# Patient Record
Sex: FEMALE | Race: WHITE | HISPANIC OR LATINO | Employment: UNEMPLOYED | URBAN - METROPOLITAN AREA
[De-identification: names, ages, dates, MRNs, and addresses within clinical notes are randomized per-mention and may not be internally consistent; named-entity substitution may affect disease eponyms.]

---

## 2018-04-22 ENCOUNTER — HOSPITAL ENCOUNTER (EMERGENCY)
Facility: HOSPITAL | Age: 23
Discharge: HOME | End: 2018-04-23
Attending: EMERGENCY MEDICINE

## 2018-04-22 DIAGNOSIS — S22.31XA CLOSED FRACTURE OF ONE RIB OF RIGHT SIDE, INITIAL ENCOUNTER: Primary | ICD-10-CM

## 2018-04-22 DIAGNOSIS — S00.83XA FOREHEAD CONTUSION, INITIAL ENCOUNTER: ICD-10-CM

## 2018-04-22 DIAGNOSIS — M54.50 LUMBAR BACK PAIN: ICD-10-CM
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
## 2018-04-22 LAB
B-HCG UR QL: NEGATIVE
CTP QC/QA: YES

## 2018-04-22 PROCEDURE — 63600175 PHARM REV CODE 636 W HCPCS: Performed by: PHYSICIAN ASSISTANT

## 2018-04-22 PROCEDURE — 99284 EMERGENCY DEPT VISIT MOD MDM: CPT | Mod: 25

## 2018-04-22 PROCEDURE — 96372 THER/PROPH/DIAG INJ SC/IM: CPT

## 2018-04-22 PROCEDURE — 81025 URINE PREGNANCY TEST: CPT | Performed by: PHYSICIAN ASSISTANT

## 2018-04-22 RX ORDER — KETOROLAC TROMETHAMINE 30 MG/ML
15 INJECTION, SOLUTION INTRAMUSCULAR; INTRAVENOUS
Status: COMPLETED | OUTPATIENT
Start: 2018-04-22 | End: 2018-04-22

## 2018-04-22 RX ADMIN — KETOROLAC TROMETHAMINE 15 MG: 30 INJECTION, SOLUTION INTRAMUSCULAR at 11:04

## 2018-04-23 VITALS
HEIGHT: 63 IN | HEART RATE: 80 BPM | RESPIRATION RATE: 16 BRPM | WEIGHT: 188 LBS | DIASTOLIC BLOOD PRESSURE: 61 MMHG | TEMPERATURE: 98 F | OXYGEN SATURATION: 98 % | BODY MASS INDEX: 33.31 KG/M2 | SYSTOLIC BLOOD PRESSURE: 118 MMHG

## 2018-04-23 RX ORDER — TRAMADOL HYDROCHLORIDE 50 MG/1
50 TABLET ORAL EVERY 6 HOURS PRN
Qty: 12 TABLET | Refills: 0 | Status: SHIPPED | OUTPATIENT
Start: 2018-04-23 | End: 2018-05-03

## 2018-04-23 RX ORDER — IBUPROFEN 600 MG/1
600 TABLET ORAL EVERY 6 HOURS PRN
Qty: 20 TABLET | Refills: 0 | Status: SHIPPED | OUTPATIENT
Start: 2018-04-23
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
## 2018-04-23 NOTE — ED TRIAGE NOTES
Pt c/o headache, lower abdominal pain on her  scar, lower back pain, blurred vision that has now subsided (started around 1830).  was in 2016. Denies N/V/D, denies dysuria. Denies taking any meds PTA

## 2018-12-21 PROCEDURE — 81025 URINE PREGNANCY TEST: CPT | Performed by: NURSE PRACTITIONER

## 2018-12-21 PROCEDURE — 96361 HYDRATE IV INFUSION ADD-ON: CPT

## 2018-12-21 PROCEDURE — 96374 THER/PROPH/DIAG INJ IV PUSH: CPT

## 2018-12-21 PROCEDURE — 99284 EMERGENCY DEPT VISIT MOD MDM: CPT | Mod: 25

## 2018-12-22 ENCOUNTER — HOSPITAL ENCOUNTER (EMERGENCY)
Facility: HOSPITAL | Age: 23
Discharge: HOME | End: 2018-12-22
Attending: EMERGENCY MEDICINE

## 2018-12-22 VITALS
BODY MASS INDEX: 33.48 KG/M2 | SYSTOLIC BLOOD PRESSURE: 122 MMHG | HEART RATE: 78 BPM | DIASTOLIC BLOOD PRESSURE: 68 MMHG | WEIGHT: 189 LBS | RESPIRATION RATE: 16 BRPM | TEMPERATURE: 99 F | OXYGEN SATURATION: 100 %

## 2018-12-22 DIAGNOSIS — D64.9 MILD ANEMIA: ICD-10-CM

## 2018-12-22 DIAGNOSIS — N93.8 DUB (DYSFUNCTIONAL UTERINE BLEEDING): Primary | ICD-10-CM

## 2018-12-22 DIAGNOSIS — K02.9 PAIN DUE TO DENTAL CARIES: ICD-10-CM

## 2018-12-22 LAB
ALBUMIN SERPL BCP-MCNC: 3.4 G/DL
ALP SERPL-CCNC: 60 U/L
ALT SERPL W/O P-5'-P-CCNC: 15 U/L
ANION GAP SERPL CALC-SCNC: 9 MMOL/L
AST SERPL-CCNC: 17 U/L
B-HCG UR QL: NEGATIVE
BACTERIA #/AREA URNS HPF: ABNORMAL /HPF
BACTERIA GENITAL QL WET PREP: ABNORMAL
BASOPHILS # BLD AUTO: 0.02 K/UL
BASOPHILS NFR BLD: 0.3 %
BILIRUB SERPL-MCNC: 0.2 MG/DL
BILIRUB UR QL STRIP: NEGATIVE
BUN SERPL-MCNC: 9 MG/DL
CALCIUM SERPL-MCNC: 9.4 MG/DL
CHLORIDE SERPL-SCNC: 107 MMOL/L
CLARITY UR: ABNORMAL
CLUE CELLS VAG QL WET PREP: ABNORMAL
CO2 SERPL-SCNC: 25 MMOL/L
COLOR UR: ABNORMAL
CREAT SERPL-MCNC: 0.7 MG/DL
CTP QC/QA: YES
DIFFERENTIAL METHOD: ABNORMAL
EOSINOPHIL # BLD AUTO: 0.2 K/UL
EOSINOPHIL NFR BLD: 2.3 %
ERYTHROCYTE [DISTWIDTH] IN BLOOD BY AUTOMATED COUNT: 13.7 %
EST. GFR  (AFRICAN AMERICAN): >60 ML/MIN/1.73 M^2
EST. GFR  (NON AFRICAN AMERICAN): >60 ML/MIN/1.73 M^2
FILAMENT FUNGI VAG WET PREP-#/AREA: ABNORMAL
GLUCOSE SERPL-MCNC: 107 MG/DL
GLUCOSE UR QL STRIP: NEGATIVE
HCT VFR BLD AUTO: 33.3 %
HGB BLD-MCNC: 11.2 G/DL
HGB UR QL STRIP: ABNORMAL
HYALINE CASTS #/AREA URNS LPF: 0 /LPF
KETONES UR QL STRIP: NEGATIVE
LEUKOCYTE ESTERASE UR QL STRIP: NEGATIVE
LYMPHOCYTES # BLD AUTO: 2.1 K/UL
LYMPHOCYTES NFR BLD: 28.2 %
MCH RBC QN AUTO: 29.1 PG
MCHC RBC AUTO-ENTMCNC: 33.6 G/DL
MCV RBC AUTO: 87 FL
MICROSCOPIC COMMENT: ABNORMAL
MONOCYTES # BLD AUTO: 0.5 K/UL
MONOCYTES NFR BLD: 6.3 %
NEUTROPHILS # BLD AUTO: 4.6 K/UL
NEUTROPHILS NFR BLD: 62.9 %
NITRITE UR QL STRIP: NEGATIVE
PH UR STRIP: 7 [PH] (ref 5–8)
PLATELET # BLD AUTO: 211 K/UL
PMV BLD AUTO: 12.2 FL
POTASSIUM SERPL-SCNC: 3.8 MMOL/L
PROT SERPL-MCNC: 7.5 G/DL
PROT UR QL STRIP: ABNORMAL
RBC # BLD AUTO: 3.85 M/UL
RBC #/AREA URNS HPF: >100 /HPF (ref 0–4)
SODIUM SERPL-SCNC: 141 MMOL/L
SP GR UR STRIP: 1.01 (ref 1–1.03)
SPECIMEN SOURCE: ABNORMAL
T VAGINALIS GENITAL QL WET PREP: ABNORMAL
URN SPEC COLLECT METH UR: ABNORMAL
UROBILINOGEN UR STRIP-ACNC: NEGATIVE EU/DL
WBC # BLD AUTO: 7.28 K/UL
WBC #/AREA URNS HPF: 0 /HPF (ref 0–5)
WBC #/AREA VAG WET PREP: ABNORMAL
YEAST GENITAL QL WET PREP: ABNORMAL

## 2018-12-22 PROCEDURE — 80053 COMPREHEN METABOLIC PANEL: CPT

## 2018-12-22 PROCEDURE — 87491 CHLMYD TRACH DNA AMP PROBE: CPT

## 2018-12-22 PROCEDURE — 85025 COMPLETE CBC W/AUTO DIFF WBC: CPT

## 2018-12-22 PROCEDURE — 81000 URINALYSIS NONAUTO W/SCOPE: CPT

## 2018-12-22 PROCEDURE — 87210 SMEAR WET MOUNT SALINE/INK: CPT

## 2018-12-22 PROCEDURE — 25000003 PHARM REV CODE 250: Performed by: PHYSICIAN ASSISTANT

## 2018-12-22 PROCEDURE — 63600175 PHARM REV CODE 636 W HCPCS: Performed by: PHYSICIAN ASSISTANT

## 2018-12-22 RX ORDER — KETOROLAC TROMETHAMINE 30 MG/ML
30 INJECTION, SOLUTION INTRAMUSCULAR; INTRAVENOUS
Status: COMPLETED | OUTPATIENT
Start: 2018-12-22 | End: 2018-12-22

## 2018-12-22 RX ORDER — IBUPROFEN 600 MG/1
600 TABLET ORAL EVERY 6 HOURS PRN
Qty: 12 TABLET | Refills: 0 | Status: SHIPPED | OUTPATIENT
Start: 2018-12-22

## 2018-12-22 RX ADMIN — SODIUM CHLORIDE 1000 ML: 0.9 INJECTION, SOLUTION INTRAVENOUS at 01:12

## 2018-12-22 RX ADMIN — KETOROLAC TROMETHAMINE 30 MG: 30 INJECTION, SOLUTION INTRAMUSCULAR at 01:12

## 2018-12-22 NOTE — ED PROVIDER NOTES
Encounter Date: 2018    SCRIBE #1 NOTE: I, Ramiro Escobar, am scribing for, and in the presence of,  Michael Abreu PA-C. I have scribed the following portions of the note - Other sections scribed: HPI, ROS.       History     Chief Complaint   Patient presents with    Abdominal Pain     pt states last menstrual period was 2 weeks ago but she continues to have bleeding abd cramping pain in lower abd    Dental Pain     pt complains of dental pain upper right x several days. taking tylenol at home but getting worse     CC: Abdominal Pain; Dental Pain    HPI: This 23 y.o female with PSHx  presents to the ED accompanied by her spouse c/o acute onset, constant, lower abdominal pain and vaginal bleeding for the last 2 weeks. No fever or N/V/D. LMP was 2 weeks ago. Pt also c/o upper R-side dental pain that began 5 days ago (18), dysuria, and increased urinary frequency. No weakness or dizziness. Pt denies attending a dentist and states she has been taking Tylenol with no relief, most recently 1900 tonight (18). She also denies any hormonal medication usage.      The history is provided by the patient and the spouse. The history is limited by a language barrier (Moroccan). A  was used (pt's spouse).     Review of patient's allergies indicates:  No Known Allergies  History reviewed. No pertinent past medical history.  Past Surgical History:   Procedure Laterality Date     SECTION       History reviewed. No pertinent family history.  Social History     Tobacco Use    Smoking status: Never Smoker   Substance Use Topics    Alcohol use: No    Drug use: No     Review of Systems   Constitutional: Negative for chills and fever.   HENT: Positive for dental problem (upper R-side pain). Negative for congestion, ear pain, rhinorrhea and sore throat.    Eyes: Negative for pain and visual disturbance.   Respiratory: Negative for cough and shortness of breath.     Cardiovascular: Negative for chest pain.   Gastrointestinal: Positive for abdominal pain (lower). Negative for diarrhea, nausea and vomiting.   Genitourinary: Positive for dysuria, frequency (increased) and vaginal bleeding.   Musculoskeletal: Negative for back pain and neck pain.   Skin: Negative for rash.   Neurological: Negative for dizziness, weakness and headaches.   All other systems reviewed and are negative.      Physical Exam     Initial Vitals [12/22/18 0009]   BP Pulse Resp Temp SpO2   127/70 86 16 98.5 °F (36.9 °C) 100 %      MAP       --         Vitals:    12/22/18 0009 12/22/18 0242   BP: 127/70 122/68   BP Location: Right arm Right arm   Patient Position: Sitting Sitting   Pulse: 86 78   Resp: 16 16   Temp: 98.5 °F (36.9 °C)    TempSrc: Oral    SpO2: 100% 100%   Weight: 85.7 kg (189 lb)        Physical Exam    Nursing note and vitals reviewed.  Constitutional: She appears well-developed and well-nourished. She is not diaphoretic. No distress.   HENT:   Head: Normocephalic and atraumatic.   Right Ear: Tympanic membrane, external ear and ear canal normal. No mastoid tenderness.   Left Ear: Tympanic membrane, external ear and ear canal normal. No mastoid tenderness.   Nose: Nose normal. Right sinus exhibits no maxillary sinus tenderness and no frontal sinus tenderness. Left sinus exhibits no maxillary sinus tenderness and no frontal sinus tenderness.   Mouth/Throat: Uvula is midline and oropharynx is clear and moist. No trismus in the jaw. Dental caries present. No dental abscesses or uvula swelling. No oropharyngeal exudate, posterior oropharyngeal edema, posterior oropharyngeal erythema or tonsillar abscesses.       No swelling or erythema to gumline. No facial swelling. Speaking in clear and complete sentences.    Eyes: Conjunctivae and EOM are normal. Right eye exhibits no discharge. Left eye exhibits no discharge.   Neck: Normal range of motion. No tracheal deviation present. No JVD present.    Cardiovascular: Normal rate, regular rhythm and normal heart sounds. Exam reveals no friction rub.    No murmur heard.  Pulmonary/Chest: Breath sounds normal. No stridor. No respiratory distress. She has no wheezes. She has no rhonchi. She has no rales. She exhibits no tenderness.   Abdominal: Soft. She exhibits no distension. There is no tenderness. There is no guarding.   Genitourinary: Pelvic exam was performed with patient supine. There is no rash, tenderness or lesion on the right labia. There is no rash, tenderness or lesion on the left labia. Cervix exhibits no motion tenderness, no discharge and no friability. Right adnexum displays no mass and no tenderness. Left adnexum displays no mass and no tenderness. There is bleeding (scant amount of dried blood in vaginal vault; no active bleeding) in the vagina. No erythema or tenderness in the vagina. No foreign body in the vagina. No signs of injury around the vagina. No vaginal discharge found.   Genitourinary Comments: Os closed   Musculoskeletal: Normal range of motion.   Lymphadenopathy:     She has no cervical adenopathy.   Neurological: She is alert and oriented to person, place, and time.   Skin: Skin is warm and dry. No rash and no abscess noted. No erythema. No pallor.         ED Course   Procedures  Labs Reviewed   URINALYSIS, REFLEX TO URINE CULTURE - Abnormal; Notable for the following components:       Result Value    Color, UA Red (*)     Appearance, UA Hazy (*)     Protein, UA 1+ (*)     Occult Blood UA 3+ (*)     All other components within normal limits    Narrative:     Preferred Collection Type->Urine, Clean Catch   CBC W/ AUTO DIFFERENTIAL - Abnormal; Notable for the following components:    RBC 3.85 (*)     Hemoglobin 11.2 (*)     Hematocrit 33.3 (*)     All other components within normal limits   COMPREHENSIVE METABOLIC PANEL - Abnormal; Notable for the following components:    Albumin 3.4 (*)     All other components within normal limits    VAGINAL SCREEN - Abnormal; Notable for the following components:    Bacteria - Vaginal Screen Many (*)     All other components within normal limits   URINALYSIS MICROSCOPIC - Abnormal; Notable for the following components:    RBC, UA >100 (*)     All other components within normal limits    Narrative:     Preferred Collection Type->Urine, Clean Catch   C. TRACHOMATIS/N. GONORRHOEAE BY AMP DNA   POCT URINE PREGNANCY          Imaging Results    None          Medical Decision Making:   History:   Old Medical Records: I decided to obtain old medical records.  Initial Assessment:   23-year-old female with multiple complaints  Clinical Tests:   Lab Tests: Ordered and Reviewed  ED Management:  No dental abscess or Junior's.  No meningeal signs. Patient's dental pain needs further investigation by a dentist.  UPT negative. No active bleeding.  Mild anemia noted on CBC today.  No indication for emergent blood transfusion.  In the absence of other findings, patient may have dysfunctional uterine bleeding that should undergo further investigation by an OBGYN on an outpatient basis.  Low suspicion for acute bacterial process, including for appendicitis.  Not convincing for UTI/pyelonephritis.  No PID on exam.    Advising follow-up with dentist and OBGYN.  Strict return precautions discussed with patient.  Patient agreeable to plan.            Scribe Attestation:   Scribe #1: I performed the above scribed service and the documentation accurately describes the services I performed. I attest to the accuracy of the note.    Attending Attestation:           Physician Attestation for Scribe:  Physician Attestation Statement for Scribe #1: I, Michael Abreu PA-C, reviewed documentation, as scribed by Ramiro Escobar in my presence, and it is both accurate and complete.                    Clinical Impression:   The primary encounter diagnosis was DUB (dysfunctional uterine bleeding). Diagnoses of Mild anemia and Pain due to dental  caries were also pertinent to this visit.                             Michael Abreu PA-C  12/22/18 0601

## 2018-12-25 LAB
C TRACH DNA SPEC QL NAA+PROBE: NOT DETECTED
N GONORRHOEA DNA SPEC QL NAA+PROBE: NOT DETECTED

## 2022-09-08 ENCOUNTER — HOSPITAL ENCOUNTER (EMERGENCY)
Facility: HOSPITAL | Age: 27
Discharge: HOME | End: 2022-09-08
Attending: EMERGENCY MEDICINE
Payer: MEDICAID

## 2022-09-08 VITALS
HEART RATE: 86 BPM | RESPIRATION RATE: 18 BRPM | WEIGHT: 230 LBS | SYSTOLIC BLOOD PRESSURE: 113 MMHG | DIASTOLIC BLOOD PRESSURE: 74 MMHG | BODY MASS INDEX: 39.27 KG/M2 | OXYGEN SATURATION: 96 % | TEMPERATURE: 99 F | HEIGHT: 64 IN

## 2022-09-08 DIAGNOSIS — R51.9 ACUTE NONINTRACTABLE HEADACHE, UNSPECIFIED HEADACHE TYPE: Primary | ICD-10-CM

## 2022-09-08 LAB
B-HCG UR QL: NEGATIVE
BILIRUB UR QL STRIP: NEGATIVE
CLARITY UR: ABNORMAL
COLOR UR: YELLOW
CTP QC/QA: YES
CTP QC/QA: YES
GLUCOSE UR QL STRIP: NEGATIVE
HGB UR QL STRIP: NEGATIVE
KETONES UR QL STRIP: NEGATIVE
LEUKOCYTE ESTERASE UR QL STRIP: NEGATIVE
NITRITE UR QL STRIP: NEGATIVE
PH UR STRIP: 8 [PH] (ref 5–8)
POCT GLUCOSE: 117 MG/DL (ref 70–110)
PROT UR QL STRIP: NEGATIVE
SARS-COV-2 RDRP RESP QL NAA+PROBE: NEGATIVE
SP GR UR STRIP: 1.02 (ref 1–1.03)
URN SPEC COLLECT METH UR: ABNORMAL
UROBILINOGEN UR STRIP-ACNC: NEGATIVE EU/DL

## 2022-09-08 PROCEDURE — 81003 URINALYSIS AUTO W/O SCOPE: CPT | Performed by: PHYSICIAN ASSISTANT

## 2022-09-08 PROCEDURE — 81025 URINE PREGNANCY TEST: CPT | Performed by: EMERGENCY MEDICINE

## 2022-09-08 PROCEDURE — 99285 EMERGENCY DEPT VISIT HI MDM: CPT | Mod: 25

## 2022-09-08 PROCEDURE — 96374 THER/PROPH/DIAG INJ IV PUSH: CPT

## 2022-09-08 PROCEDURE — U0002 COVID-19 LAB TEST NON-CDC: HCPCS | Performed by: PHYSICIAN ASSISTANT

## 2022-09-08 PROCEDURE — 96361 HYDRATE IV INFUSION ADD-ON: CPT

## 2022-09-08 PROCEDURE — 63600175 PHARM REV CODE 636 W HCPCS: Performed by: PHYSICIAN ASSISTANT

## 2022-09-08 PROCEDURE — 82962 GLUCOSE BLOOD TEST: CPT

## 2022-09-08 PROCEDURE — 96375 TX/PRO/DX INJ NEW DRUG ADDON: CPT

## 2022-09-08 RX ORDER — DIPHENHYDRAMINE HYDROCHLORIDE 50 MG/ML
25 INJECTION INTRAMUSCULAR; INTRAVENOUS
Status: COMPLETED | OUTPATIENT
Start: 2022-09-08 | End: 2022-09-08

## 2022-09-08 RX ORDER — PROCHLORPERAZINE EDISYLATE 5 MG/ML
10 INJECTION INTRAMUSCULAR; INTRAVENOUS ONCE
Status: COMPLETED | OUTPATIENT
Start: 2022-09-08 | End: 2022-09-08

## 2022-09-08 RX ORDER — PROCHLORPERAZINE MALEATE 10 MG
10 TABLET ORAL 3 TIMES DAILY PRN
Qty: 10 TABLET | Refills: 0 | Status: SHIPPED | OUTPATIENT
Start: 2022-09-08

## 2022-09-08 RX ORDER — DIPHENHYDRAMINE HCL 25 MG
25 CAPSULE ORAL 3 TIMES DAILY PRN
Qty: 10 CAPSULE | Refills: 0 | Status: SHIPPED | OUTPATIENT
Start: 2022-09-08

## 2022-09-08 RX ORDER — DEXAMETHASONE SODIUM PHOSPHATE 4 MG/ML
12 INJECTION, SOLUTION INTRA-ARTICULAR; INTRALESIONAL; INTRAMUSCULAR; INTRAVENOUS; SOFT TISSUE
Status: COMPLETED | OUTPATIENT
Start: 2022-09-08 | End: 2022-09-08

## 2022-09-08 RX ADMIN — DIPHENHYDRAMINE HYDROCHLORIDE 25 MG: 50 INJECTION, SOLUTION INTRAMUSCULAR; INTRAVENOUS at 04:09

## 2022-09-08 RX ADMIN — SODIUM CHLORIDE, SODIUM LACTATE, POTASSIUM CHLORIDE, AND CALCIUM CHLORIDE 1000 ML: .6; .31; .03; .02 INJECTION, SOLUTION INTRAVENOUS at 04:09

## 2022-09-08 RX ADMIN — DEXAMETHASONE SODIUM PHOSPHATE 12 MG: 4 INJECTION INTRA-ARTICULAR; INTRALESIONAL; INTRAMUSCULAR; INTRAVENOUS; SOFT TISSUE at 04:09

## 2022-09-08 RX ADMIN — PROCHLORPERAZINE EDISYLATE 10 MG: 5 INJECTION INTRAMUSCULAR; INTRAVENOUS at 04:09

## 2022-09-08 NOTE — ED TRIAGE NOTES
Pt presents to ED c/o HA accompanied by nausea and dizziness.  Pt is alert, calm, and oriented x4, placed on continuous pulse ox, 100% on RA.

## 2022-09-08 NOTE — DISCHARGE INSTRUCTIONS
Continúe con Tylenol o ibuprofeno según sea necesario para el dolor de sebastien. Puede salina robson dosis única de Compazine y Benadryl juntos según sea necesario para el dolor de sebastien intenso continuo a pesar del tratamiento. Regrese a rose mary servicio de urgencias si el dolor de sebastien empeora a pesar del tratamiento, si comienza con vómitos frecuentes, si se siente aturdido o mareado, si no puede caminar o soportar peso, si ocurre cualquier otro problema.    Continue with Tylenol or ibuprofen as needed for headache.  You can take a single dose of Compazine and Benadryl together as needed for continued severe headache despite treatment.  Return to this ED if worsening headache despite treatment, if you begin with frequent vomiting, if you feel lightheaded or dizzy, if unable to walk or bear weight, if any other problems occur.

## 2022-09-08 NOTE — ED PROVIDER NOTES
Encounter Date: 2022       History     Chief Complaint   Patient presents with    Headache     Started around  last night, states moves around. Accompanied by nausea. Had blurred vision but resolved.Took IBU without relief.     28yo F with chief complaint severe headache to the crown of her head since earlier this evening.  Denies history of similar headaches.  Denies history of headache disorder.  Denies neck pain or stiffness.  No fever.  No trauma.  She states there was mild OU blurred vision with daily, has resolved.  No diplopia, no floaters or flashes of light she does admit to severe photophobia.  Denies eye pain.  No nausea vomiting.  No recent illness.  No known sick contacts.  No relief with ibuprofen.  Headache is severe, described as a throbbing pressure.  No associated or leg weakness, slurred speech, facial droop, unsteady gait, aphasia.    Denies any significant past medical history    Review of patient's allergies indicates:  No Known Allergies  History reviewed. No pertinent past medical history.  Past Surgical History:   Procedure Laterality Date     SECTION       History reviewed. No pertinent family history.  Social History     Tobacco Use    Smoking status: Never   Substance Use Topics    Alcohol use: No    Drug use: No     Review of Systems   Constitutional:  Negative for appetite change, chills and fever.   Eyes:  Positive for photophobia and visual disturbance. Negative for pain.   Respiratory:  Negative for shortness of breath.    Cardiovascular:  Negative for chest pain.   Gastrointestinal:  Negative for nausea and vomiting.   Musculoskeletal:  Negative for myalgias, neck pain and neck stiffness.   Neurological:  Positive for headaches. Negative for dizziness, syncope and light-headedness.     Physical Exam     Initial Vitals [22 0122]   BP Pulse Resp Temp SpO2   132/68 93 17 98.5 °F (36.9 °C) 100 %      MAP       --         Physical Exam    Nursing note and vitals  reviewed.  Constitutional: She appears well-developed and well-nourished. She is not diaphoretic. No distress.   Uncomfortable appearing, nontoxic.   HENT:   Head: Normocephalic and atraumatic.   Eyes: EOM are normal. Pupils are equal, round, and reactive to light.   Neck: Neck supple.   No nuchal rigidity   Normal range of motion.  Pulmonary/Chest: No respiratory distress.   Musculoskeletal:         General: Normal range of motion.      Cervical back: Normal range of motion and neck supple.     Neurological: She is alert and oriented to person, place, and time. GCS score is 15. GCS eye subscore is 4. GCS verbal subscore is 5. GCS motor subscore is 6.   Skin: Skin is warm. Capillary refill takes less than 2 seconds.   Psychiatric: She has a normal mood and affect. Thought content normal.       ED Course   Procedures  Labs Reviewed   URINALYSIS, REFLEX TO URINE CULTURE - Abnormal; Notable for the following components:       Result Value    Appearance, UA Hazy (*)     All other components within normal limits    Narrative:     Specimen Source->Urine   POCT GLUCOSE - Abnormal; Notable for the following components:    POCT Glucose 117 (*)     All other components within normal limits   POCT URINE PREGNANCY   SARS-COV-2 RDRP GENE   POCT GLUCOSE MONITORING CONTINUOUS          Imaging Results              CT Head Without Contrast (Final result)  Result time 09/08/22 03:36:40      Final result by Brianda Hernandez MD (09/08/22 03:36:40)                   Impression:      No CT evidence of acute intracranial abnormality. If the patient's headaches are sufficiently clinically suspicious, or associated with signs of elevated ICP, focal neurologic deficits, nausea, or vomiting, further evaluation with MRI can be performed if there are no clinical contraindications.      Electronically signed by: Brianda Hernandez MD  Date:    09/08/2022  Time:    03:36               Narrative:    EXAMINATION:  CT HEAD WITHOUT CONTRAST    CLINICAL  HISTORY:  Headache, new or worsening, neuro deficit (Age 19-49y);    TECHNIQUE:  Low dose axial images were obtained through the head.  Coronal and sagittal reformations were also performed. Contrast was not administered.    COMPARISON:  None.    FINDINGS:  There is no acute intracranial hemorrhage, hydrocephalus, midline shift or mass effect. Gray-white matter differentiation appears maintained. The basal cisterns are patent. The mastoid air cells are clear.  There is mild mucosal thickening of the left maxillary sinus and ethmoid air cells..  The visualized bones of the calvarium demonstrate no acute osseous abnormality.                                       Medications   lactated ringers bolus 1,000 mL (1,000 mLs Intravenous New Bag 9/8/22 0449)   dexamethasone injection 12 mg (12 mg Intravenous Given 9/8/22 0420)   diphenhydrAMINE injection 25 mg (25 mg Intravenous Given 9/8/22 0405)   prochlorperazine injection Soln 10 mg (10 mg Intravenous Given 9/8/22 0425)     Medical Decision Making:   Differential Diagnosis:   Headache disorder, CVA, meningitis           ED Course as of 09/08/22 0527   Thu Sep 08, 2022   0521 Headache resolved on re-evaluation.  Will discharge with similar medications as needed for headache.  Advised follow-up with local PCP.  Information for follow-up given.  Return precautions given. [SM]      ED Course User Index  [SM] Davie Rendon PA-C             Clinical Impression:   Final diagnoses:  [R51.9] Acute nonintractable headache, unspecified headache type (Primary)      ED Disposition Condition    Discharge Stable          ED Prescriptions       Medication Sig Dispense Start Date End Date Auth. Provider    prochlorperazine (COMPAZINE) 10 MG tablet Take 1 tablet (10 mg total) by mouth 3 (three) times daily as needed (Severe headache--take with 25mg of Benadryl). 10 tablet 9/8/2022 -- Davie Rendon PA-C    diphenhydrAMINE (BENADRYL) 25 mg capsule Take 1 capsule (25 mg total) by  mouth 3 (three) times daily as needed (Severe headache--take with 10mg of Compazine). 10 capsule 9/8/2022 -- Davie Rendon PA-C          Follow-up Information    None          Davie Rendon PA-C  09/08/22 0557

## 2023-05-31 ENCOUNTER — HOSPITAL ENCOUNTER (OUTPATIENT)
Facility: HOSPITAL | Age: 28
Discharge: HOME | End: 2023-05-31
Attending: OBSTETRICS & GYNECOLOGY | Admitting: OBSTETRICS & GYNECOLOGY
Payer: MEDICAID

## 2023-05-31 VITALS
HEART RATE: 80 BPM | DIASTOLIC BLOOD PRESSURE: 60 MMHG | SYSTOLIC BLOOD PRESSURE: 108 MMHG | HEIGHT: 64 IN | TEMPERATURE: 98 F | BODY MASS INDEX: 39.27 KG/M2 | RESPIRATION RATE: 18 BRPM | WEIGHT: 230 LBS | OXYGEN SATURATION: 99 %

## 2023-05-31 DIAGNOSIS — R10.2 PELVIC PAIN: ICD-10-CM

## 2023-05-31 LAB
BILIRUB UR QL STRIP: NEGATIVE
CLARITY UR: CLEAR
COLOR UR: YELLOW
GLUCOSE UR QL STRIP: NEGATIVE
HGB UR QL STRIP: NEGATIVE
KETONES UR QL STRIP: NEGATIVE
LEUKOCYTE ESTERASE UR QL STRIP: NEGATIVE
NITRITE UR QL STRIP: NEGATIVE
PH UR STRIP: 7 [PH] (ref 5–8)
PROT UR QL STRIP: ABNORMAL
SP GR UR STRIP: 1.03 (ref 1–1.03)
URN SPEC COLLECT METH UR: ABNORMAL
UROBILINOGEN UR STRIP-ACNC: NEGATIVE EU/DL

## 2023-05-31 PROCEDURE — 99211 OFF/OP EST MAY X REQ PHY/QHP: CPT

## 2023-05-31 PROCEDURE — G0378 HOSPITAL OBSERVATION PER HR: HCPCS

## 2023-05-31 PROCEDURE — 81003 URINALYSIS AUTO W/O SCOPE: CPT | Performed by: OBSTETRICS & GYNECOLOGY

## 2023-05-31 NOTE — DISCHARGE INSTRUCTIONS
Home Undelivered Discharge Instructions    After Discharge Orders:    No future appointments.      Current Discharge Medication List        CONTINUE these medications which have NOT CHANGED    Details   acetaminophen (TYLENOL ORAL) Take by mouth.      diphenhydrAMINE (BENADRYL) 25 mg capsule Take 1 capsule (25 mg total) by mouth 3 (three) times daily as needed (Severe headache--take with 10mg of Compazine).  Qty: 10 capsule, Refills: 0      !! ibuprofen (ADVIL,MOTRIN) 600 MG tablet Take 1 tablet (600 mg total) by mouth every 6 (six) hours as needed for Pain.  Qty: 20 tablet, Refills: 0      !! ibuprofen (ADVIL,MOTRIN) 600 MG tablet Take 1 tablet (600 mg total) by mouth every 6 (six) hours as needed for Pain.  Qty: 12 tablet, Refills: 0      prochlorperazine (COMPAZINE) 10 MG tablet Take 1 tablet (10 mg total) by mouth 3 (three) times daily as needed (Severe headache--take with 25mg of Benadryl).  Qty: 10 tablet, Refills: 0       !! - Potential duplicate medications found. Please discuss with provider.                  Diet:  normal diet as tolerated    Rest: normal activity as tolerated    Other instructions: Do kick counts once a day on your baby. Choose the time of day your baby is most active. Get in a comfortable lying or sitting position and time how long it takes to feel 10 kicks, twists, turns, swishes, or rolls.     Call physician or midwife, return to Labor and Delivery, call 911, or go to the nearest Emergency Room if: increased leakage or fluid, contractions more than  6 per  1 hour, decreased fetal movement, persistent low back pain or cramping, bleeding from vaginal area, difficulty urinating, pain with urination, difficulty breathing, new calf pain, persistent headache, or vision change

## 2023-05-31 NOTE — NURSING
Dr Mancera notified of patient arrival and US results ([facility]) and [facility]. Orders for [facility]; if closed discharge home. If patient would like to deliver at [facility], Dr Mancera is open to continuing her care. Patient updated on plan of care and discharge. (SVE (closed/thick/-4)

## 2023-05-31 NOTE — NURSING
Faustino #     Patient reports burning sensation in abdomen and vaginal heaviness this morning, discomfort increases with walking. Denies UTI symptoms and vaginal bleeding. Prior c/s for failure to progress in 2016. No complication with prior deliveries. Established care with [facility] Dr. Pardo x1 visit (1st US scheduled Friday). Urine collected. [facility] doppler 155.

## 2024-06-23 ENCOUNTER — ANESTHESIA (OUTPATIENT)
Dept: OBSTETRICS AND GYNECOLOGY | Facility: HOSPITAL | Age: 29
End: 2024-06-23
Payer: MEDICAID

## 2024-06-23 ENCOUNTER — ANESTHESIA EVENT (OUTPATIENT)
Dept: OBSTETRICS AND GYNECOLOGY | Facility: HOSPITAL | Age: 29
End: 2024-06-23
Payer: MEDICAID

## 2024-06-23 ENCOUNTER — HOSPITAL ENCOUNTER (INPATIENT)
Facility: HOSPITAL | Age: 29
LOS: 2 days | Discharge: HOME | End: 2024-06-25
Attending: STUDENT IN AN ORGANIZED HEALTH CARE EDUCATION/TRAINING PROGRAM | Admitting: OBSTETRICS & GYNECOLOGY
Payer: MEDICAID

## 2024-06-23 DIAGNOSIS — N93.9 VAGINAL HEMORRHAGE: ICD-10-CM

## 2024-06-23 DIAGNOSIS — O36.4XX0 FETAL DEMISE, GREATER THAN 22 WEEKS, ANTEPARTUM, SINGLE GESTATION: ICD-10-CM

## 2024-06-23 DIAGNOSIS — O46.90 VAGINAL BLEEDING IN PREGNANCY: ICD-10-CM

## 2024-06-23 DIAGNOSIS — O99.019 ANEMIA AFFECTING FIFTH PREGNANCY: ICD-10-CM

## 2024-06-23 DIAGNOSIS — Z98.891 STATUS POST CESAREAN SECTION: Primary | ICD-10-CM

## 2024-06-23 DIAGNOSIS — O09.40 ANEMIA AFFECTING FIFTH PREGNANCY: ICD-10-CM

## 2024-06-23 DIAGNOSIS — E86.1 HYPOTENSION DUE TO HYPOVOLEMIA: ICD-10-CM

## 2024-06-23 DIAGNOSIS — O02.1 FETAL DEMISE BEFORE 20 WEEKS WITH RETENTION OF DEAD FETUS: ICD-10-CM

## 2024-06-23 DIAGNOSIS — O09.32 INSUFFICIENT PRENATAL CARE IN SECOND TRIMESTER: ICD-10-CM

## 2024-06-23 PROBLEM — D64.9 SEVERE ANEMIA: Status: ACTIVE | Noted: 2024-06-23

## 2024-06-23 PROBLEM — O34.219 PREVIOUS CESAREAN SECTION COMPLICATING PREGNANCY, ANTEPARTUM CONDITION OR COMPLICATION: Status: ACTIVE | Noted: 2024-06-23

## 2024-06-23 PROBLEM — Z3A.27 27 WEEKS GESTATION OF PREGNANCY: Status: RESOLVED | Noted: 2024-06-23 | Resolved: 2024-06-23

## 2024-06-23 PROBLEM — Z3A.27 27 WEEKS GESTATION OF PREGNANCY: Status: ACTIVE | Noted: 2024-06-23

## 2024-06-23 LAB
ABO + RH BLD: NORMAL
ALBUMIN SERPL BCP-MCNC: 1.9 G/DL (ref 3.5–5.2)
ALBUMIN SERPL BCP-MCNC: 2.2 G/DL (ref 3.5–5.2)
ALLENS TEST: ABNORMAL
ALLENS TEST: ABNORMAL
ALP SERPL-CCNC: 56 U/L (ref 55–135)
ALP SERPL-CCNC: 59 U/L (ref 55–135)
ALT SERPL W/O P-5'-P-CCNC: 7 U/L (ref 10–44)
ALT SERPL W/O P-5'-P-CCNC: 8 U/L (ref 10–44)
ANION GAP SERPL CALC-SCNC: 14 MMOL/L (ref 8–16)
ANION GAP SERPL CALC-SCNC: 6 MMOL/L (ref 8–16)
ANION GAP SERPL CALC-SCNC: 8 MMOL/L (ref 8–16)
APTT PPP: 23.4 SEC (ref 21–32)
AST SERPL-CCNC: 11 U/L (ref 10–40)
AST SERPL-CCNC: 14 U/L (ref 10–40)
BASOPHILS # BLD AUTO: 0.01 K/UL (ref 0–0.2)
BASOPHILS # BLD AUTO: 0.01 K/UL (ref 0–0.2)
BASOPHILS # BLD AUTO: 0.02 K/UL (ref 0–0.2)
BASOPHILS # BLD AUTO: 0.03 K/UL (ref 0–0.2)
BASOPHILS # BLD AUTO: 0.03 K/UL (ref 0–0.2)
BASOPHILS NFR BLD: 0.1 % (ref 0–1.9)
BASOPHILS NFR BLD: 0.1 % (ref 0–1.9)
BASOPHILS NFR BLD: 0.2 % (ref 0–1.9)
BILIRUB SERPL-MCNC: 0.3 MG/DL (ref 0.1–1)
BILIRUB SERPL-MCNC: 0.9 MG/DL (ref 0.1–1)
BLD GP AB SCN CELLS X3 SERPL QL: NORMAL
BLD PROD TYP BPU: NORMAL
BLOOD UNIT EXPIRATION DATE: NORMAL
BLOOD UNIT TYPE CODE: 9500
BLOOD UNIT TYPE: NORMAL
BUN SERPL-MCNC: 8 MG/DL (ref 6–20)
BUN SERPL-MCNC: 8 MG/DL (ref 6–30)
BUN SERPL-MCNC: 9 MG/DL (ref 6–20)
CALCIUM SERPL-MCNC: 7 MG/DL (ref 8.7–10.5)
CALCIUM SERPL-MCNC: 7.6 MG/DL (ref 8.7–10.5)
CHLORIDE SERPL-SCNC: 109 MMOL/L (ref 95–110)
CHLORIDE SERPL-SCNC: 112 MMOL/L (ref 95–110)
CHLORIDE SERPL-SCNC: 113 MMOL/L (ref 95–110)
CO2 SERPL-SCNC: 15 MMOL/L (ref 23–29)
CO2 SERPL-SCNC: 19 MMOL/L (ref 23–29)
CODING SYSTEM: NORMAL
CREAT SERPL-MCNC: 0.5 MG/DL (ref 0.5–1.4)
CREAT SERPL-MCNC: 0.6 MG/DL (ref 0.5–1.4)
CREAT SERPL-MCNC: 0.6 MG/DL (ref 0.5–1.4)
CROSSMATCH INTERPRETATION: NORMAL
D DIMER PPP IA.FEU-MCNC: 0.86 MG/L FEU
DIFFERENTIAL METHOD BLD: ABNORMAL
DISPENSE STATUS: NORMAL
EOSINOPHIL # BLD AUTO: 0 K/UL (ref 0–0.5)
EOSINOPHIL NFR BLD: 0 % (ref 0–8)
EOSINOPHIL NFR BLD: 0.1 % (ref 0–8)
EOSINOPHIL NFR BLD: 0.1 % (ref 0–8)
ERYTHROCYTE [DISTWIDTH] IN BLOOD BY AUTOMATED COUNT: 16.7 % (ref 11.5–14.5)
ERYTHROCYTE [DISTWIDTH] IN BLOOD BY AUTOMATED COUNT: 16.8 % (ref 11.5–14.5)
ERYTHROCYTE [DISTWIDTH] IN BLOOD BY AUTOMATED COUNT: 17.2 % (ref 11.5–14.5)
ERYTHROCYTE [DISTWIDTH] IN BLOOD BY AUTOMATED COUNT: 17.5 % (ref 11.5–14.5)
ERYTHROCYTE [DISTWIDTH] IN BLOOD BY AUTOMATED COUNT: 17.5 % (ref 11.5–14.5)
EST. GFR  (NO RACE VARIABLE): >60 ML/MIN/1.73 M^2
EST. GFR  (NO RACE VARIABLE): >60 ML/MIN/1.73 M^2
FIBRINOGEN PPP-MCNC: 439 MG/DL (ref 182–400)
GLUCOSE SERPL-MCNC: 108 MG/DL (ref 70–110)
GLUCOSE SERPL-MCNC: 157 MG/DL (ref 70–110)
GLUCOSE SERPL-MCNC: 169 MG/DL (ref 70–110)
HBV SURFACE AG SERPL QL IA: NORMAL
HCG INTACT+B SERPL-ACNC: 2273 MIU/ML
HCO3 UR-SCNC: 17 MMOL/L (ref 24–28)
HCT VFR BLD AUTO: 18.7 % (ref 37–48.5)
HCT VFR BLD AUTO: 21.6 % (ref 37–48.5)
HCT VFR BLD AUTO: 23.3 % (ref 37–48.5)
HCT VFR BLD AUTO: 24.5 % (ref 37–48.5)
HCT VFR BLD AUTO: 24.5 % (ref 37–48.5)
HCT VFR BLD CALC: 22 %PCV (ref 36–54)
HGB BLD-MCNC: 5.7 G/DL (ref 12–16)
HGB BLD-MCNC: 7 G/DL (ref 12–16)
HGB BLD-MCNC: 7.2 G/DL (ref 12–16)
HGB BLD-MCNC: 7.7 G/DL (ref 12–16)
HGB BLD-MCNC: 7.7 G/DL (ref 12–16)
HIV1+2 IGG SERPL QL IA.RAPID: NORMAL
IMM GRANULOCYTES # BLD AUTO: 0.09 K/UL (ref 0–0.04)
IMM GRANULOCYTES # BLD AUTO: 0.11 K/UL (ref 0–0.04)
IMM GRANULOCYTES # BLD AUTO: 0.11 K/UL (ref 0–0.04)
IMM GRANULOCYTES NFR BLD AUTO: 0.6 % (ref 0–0.5)
IMM GRANULOCYTES NFR BLD AUTO: 0.6 % (ref 0–0.5)
IMM GRANULOCYTES NFR BLD AUTO: 0.7 % (ref 0–0.5)
IMM GRANULOCYTES NFR BLD AUTO: 0.7 % (ref 0–0.5)
IMM GRANULOCYTES NFR BLD AUTO: 0.8 % (ref 0–0.5)
INR PPP: 1 (ref 0.8–1.2)
LYMPHOCYTES # BLD AUTO: 0.7 K/UL (ref 1–4.8)
LYMPHOCYTES # BLD AUTO: 0.8 K/UL (ref 1–4.8)
LYMPHOCYTES # BLD AUTO: 0.9 K/UL (ref 1–4.8)
LYMPHOCYTES # BLD AUTO: 0.9 K/UL (ref 1–4.8)
LYMPHOCYTES # BLD AUTO: 1.1 K/UL (ref 1–4.8)
LYMPHOCYTES NFR BLD: 4.9 % (ref 18–48)
LYMPHOCYTES NFR BLD: 4.9 % (ref 18–48)
LYMPHOCYTES NFR BLD: 5.9 % (ref 18–48)
LYMPHOCYTES NFR BLD: 7.8 % (ref 18–48)
LYMPHOCYTES NFR BLD: 8.1 % (ref 18–48)
MCH RBC QN AUTO: 22.9 PG (ref 27–31)
MCH RBC QN AUTO: 24.8 PG (ref 27–31)
MCH RBC QN AUTO: 24.8 PG (ref 27–31)
MCH RBC QN AUTO: 26 PG (ref 27–31)
MCH RBC QN AUTO: 26.3 PG (ref 27–31)
MCHC RBC AUTO-ENTMCNC: 30.5 G/DL (ref 32–36)
MCHC RBC AUTO-ENTMCNC: 30.9 G/DL (ref 32–36)
MCHC RBC AUTO-ENTMCNC: 31.4 G/DL (ref 32–36)
MCHC RBC AUTO-ENTMCNC: 31.4 G/DL (ref 32–36)
MCHC RBC AUTO-ENTMCNC: 32.4 G/DL (ref 32–36)
MCV RBC AUTO: 75 FL (ref 82–98)
MCV RBC AUTO: 79 FL (ref 82–98)
MCV RBC AUTO: 79 FL (ref 82–98)
MCV RBC AUTO: 81 FL (ref 82–98)
MCV RBC AUTO: 84 FL (ref 82–98)
MONOCYTES # BLD AUTO: 0.3 K/UL (ref 0.3–1)
MONOCYTES # BLD AUTO: 0.5 K/UL (ref 0.3–1)
MONOCYTES # BLD AUTO: 0.6 K/UL (ref 0.3–1)
MONOCYTES NFR BLD: 2.5 % (ref 4–15)
MONOCYTES NFR BLD: 2.5 % (ref 4–15)
MONOCYTES NFR BLD: 2.6 % (ref 4–15)
MONOCYTES NFR BLD: 4 % (ref 4–15)
MONOCYTES NFR BLD: 4.2 % (ref 4–15)
NEUTROPHILS # BLD AUTO: 11 K/UL (ref 1.8–7.7)
NEUTROPHILS # BLD AUTO: 11.7 K/UL (ref 1.8–7.7)
NEUTROPHILS # BLD AUTO: 17.1 K/UL (ref 1.8–7.7)
NEUTROPHILS # BLD AUTO: 17.1 K/UL (ref 1.8–7.7)
NEUTROPHILS # BLD AUTO: 9.5 K/UL (ref 1.8–7.7)
NEUTROPHILS NFR BLD: 86.8 % (ref 38–73)
NEUTROPHILS NFR BLD: 88.5 % (ref 38–73)
NEUTROPHILS NFR BLD: 89.3 % (ref 38–73)
NEUTROPHILS NFR BLD: 91.8 % (ref 38–73)
NEUTROPHILS NFR BLD: 91.8 % (ref 38–73)
NRBC BLD-RTO: 0 /100 WBC
PCO2 BLDA: 29 MMHG (ref 35–45)
PH SMN: 7.38 [PH] (ref 7.35–7.45)
PLATELET # BLD AUTO: 120 K/UL (ref 150–450)
PLATELET # BLD AUTO: 127 K/UL (ref 150–450)
PLATELET # BLD AUTO: 152 K/UL (ref 150–450)
PLATELET # BLD AUTO: 173 K/UL (ref 150–450)
PLATELET # BLD AUTO: 173 K/UL (ref 150–450)
PMV BLD AUTO: 11.1 FL (ref 9.2–12.9)
PMV BLD AUTO: 11.1 FL (ref 9.2–12.9)
PMV BLD AUTO: 11.4 FL (ref 9.2–12.9)
PMV BLD AUTO: 11.6 FL (ref 9.2–12.9)
PMV BLD AUTO: 11.9 FL (ref 9.2–12.9)
PO2 BLDA: 57 MMHG (ref 40–60)
POC BE: -7 MMOL/L
POC IONIZED CALCIUM: 1.13 MMOL/L (ref 1.06–1.42)
POC SATURATED O2: 89 % (ref 95–100)
POC TCO2 (MEASURED): 17 MMOL/L (ref 23–29)
POC TCO2: 18 MMOL/L (ref 24–29)
POTASSIUM BLD-SCNC: 3.5 MMOL/L (ref 3.5–5.1)
POTASSIUM SERPL-SCNC: 3.8 MMOL/L (ref 3.5–5.1)
POTASSIUM SERPL-SCNC: 3.8 MMOL/L (ref 3.5–5.1)
PROT SERPL-MCNC: 4.8 G/DL (ref 6–8.4)
PROT SERPL-MCNC: 5.3 G/DL (ref 6–8.4)
PROTHROMBIN TIME: 10.9 SEC (ref 9–12.5)
RBC # BLD AUTO: 2.49 M/UL (ref 4–5.4)
RBC # BLD AUTO: 2.66 M/UL (ref 4–5.4)
RBC # BLD AUTO: 2.77 M/UL (ref 4–5.4)
RBC # BLD AUTO: 3.11 M/UL (ref 4–5.4)
RBC # BLD AUTO: 3.11 M/UL (ref 4–5.4)
SAMPLE: ABNORMAL
SAMPLE: ABNORMAL
SITE: ABNORMAL
SITE: ABNORMAL
SODIUM BLD-SCNC: 136 MMOL/L (ref 136–145)
SODIUM SERPL-SCNC: 136 MMOL/L (ref 136–145)
SODIUM SERPL-SCNC: 137 MMOL/L (ref 136–145)
SPECIMEN OUTDATE: NORMAL
TRANS ERYTHROCYTES VOL PATIENT: NORMAL ML
TREPONEMA PALLIDUM IGG+IGM AB [PRESENCE] IN SERUM OR PLASMA BY IMMUNOASSAY: NONREACTIVE
WBC # BLD AUTO: 10.75 K/UL (ref 3.9–12.7)
WBC # BLD AUTO: 12.27 K/UL (ref 3.9–12.7)
WBC # BLD AUTO: 13.45 K/UL (ref 3.9–12.7)
WBC # BLD AUTO: 18.59 K/UL (ref 3.9–12.7)
WBC # BLD AUTO: 18.59 K/UL (ref 3.9–12.7)

## 2024-06-23 PROCEDURE — 30233N1 TRANSFUSION OF NONAUTOLOGOUS RED BLOOD CELLS INTO PERIPHERAL VEIN, PERCUTANEOUS APPROACH: ICD-10-PCS | Performed by: OBSTETRICS & GYNECOLOGY

## 2024-06-23 PROCEDURE — 85730 THROMBOPLASTIN TIME PARTIAL: CPT | Performed by: ANESTHESIOLOGY

## 2024-06-23 PROCEDURE — 25000003 PHARM REV CODE 250: Performed by: NURSE ANESTHETIST, CERTIFIED REGISTERED

## 2024-06-23 PROCEDURE — 63600175 PHARM REV CODE 636 W HCPCS: Performed by: OBSTETRICS & GYNECOLOGY

## 2024-06-23 PROCEDURE — 86901 BLOOD TYPING SEROLOGIC RH(D): CPT | Performed by: OBSTETRICS & GYNECOLOGY

## 2024-06-23 PROCEDURE — 99211 OFF/OP EST MAY X REQ PHY/QHP: CPT | Mod: 25

## 2024-06-23 PROCEDURE — 37000009 HC ANESTHESIA EA ADD 15 MINS: Performed by: OBSTETRICS & GYNECOLOGY

## 2024-06-23 PROCEDURE — 86703 HIV-1/HIV-2 1 RESULT ANTBDY: CPT | Performed by: OBSTETRICS & GYNECOLOGY

## 2024-06-23 PROCEDURE — 86593 SYPHILIS TEST NON-TREP QUANT: CPT | Performed by: OBSTETRICS & GYNECOLOGY

## 2024-06-23 PROCEDURE — 88307 TISSUE EXAM BY PATHOLOGIST: CPT | Performed by: PATHOLOGY

## 2024-06-23 PROCEDURE — 80053 COMPREHEN METABOLIC PANEL: CPT | Performed by: OBSTETRICS & GYNECOLOGY

## 2024-06-23 PROCEDURE — 86850 RBC ANTIBODY SCREEN: CPT | Performed by: OBSTETRICS & GYNECOLOGY

## 2024-06-23 PROCEDURE — 25000003 PHARM REV CODE 250: Performed by: OBSTETRICS & GYNECOLOGY

## 2024-06-23 PROCEDURE — 63600175 PHARM REV CODE 636 W HCPCS: Mod: JZ,JG | Performed by: ANESTHESIOLOGY

## 2024-06-23 PROCEDURE — 99285 EMERGENCY DEPT VISIT HI MDM: CPT | Mod: 25,27

## 2024-06-23 PROCEDURE — 84132 ASSAY OF SERUM POTASSIUM: CPT

## 2024-06-23 PROCEDURE — 82565 ASSAY OF CREATININE: CPT

## 2024-06-23 PROCEDURE — 72100002 HC LABOR CARE, 1ST 8 HOURS

## 2024-06-23 PROCEDURE — P9021 RED BLOOD CELLS UNIT: HCPCS | Performed by: STUDENT IN AN ORGANIZED HEALTH CARE EDUCATION/TRAINING PROGRAM

## 2024-06-23 PROCEDURE — 37000008 HC ANESTHESIA 1ST 15 MINUTES: Performed by: OBSTETRICS & GYNECOLOGY

## 2024-06-23 PROCEDURE — 82330 ASSAY OF CALCIUM: CPT

## 2024-06-23 PROCEDURE — 99900035 HC TECH TIME PER 15 MIN (STAT)

## 2024-06-23 PROCEDURE — 86920 COMPATIBILITY TEST SPIN: CPT | Performed by: STUDENT IN AN ORGANIZED HEALTH CARE EDUCATION/TRAINING PROGRAM

## 2024-06-23 PROCEDURE — 85014 HEMATOCRIT: CPT

## 2024-06-23 PROCEDURE — 86762 RUBELLA ANTIBODY: CPT | Performed by: OBSTETRICS & GYNECOLOGY

## 2024-06-23 PROCEDURE — 86920 COMPATIBILITY TEST SPIN: CPT | Performed by: OBSTETRICS & GYNECOLOGY

## 2024-06-23 PROCEDURE — 85610 PROTHROMBIN TIME: CPT | Performed by: ANESTHESIOLOGY

## 2024-06-23 PROCEDURE — 36415 COLL VENOUS BLD VENIPUNCTURE: CPT | Performed by: ANESTHESIOLOGY

## 2024-06-23 PROCEDURE — 36004725 HC OB OR TIME LEV III - EA ADD 15 MIN: Performed by: OBSTETRICS & GYNECOLOGY

## 2024-06-23 PROCEDURE — 27200688 HC TRAY, SPINAL-HYPER/ ISOBARIC: Performed by: ANESTHESIOLOGY

## 2024-06-23 PROCEDURE — P9016 RBC LEUKOCYTES REDUCED: HCPCS | Performed by: OBSTETRICS & GYNECOLOGY

## 2024-06-23 PROCEDURE — 99222 1ST HOSP IP/OBS MODERATE 55: CPT | Mod: ,,, | Performed by: OBSTETRICS & GYNECOLOGY

## 2024-06-23 PROCEDURE — 82803 BLOOD GASES ANY COMBINATION: CPT

## 2024-06-23 PROCEDURE — 63600175 PHARM REV CODE 636 W HCPCS: Performed by: NURSE ANESTHETIST, CERTIFIED REGISTERED

## 2024-06-23 PROCEDURE — 36430 TRANSFUSION BLD/BLD COMPNT: CPT

## 2024-06-23 PROCEDURE — 11000001 HC ACUTE MED/SURG PRIVATE ROOM

## 2024-06-23 PROCEDURE — 63600175 PHARM REV CODE 636 W HCPCS: Performed by: ANESTHESIOLOGY

## 2024-06-23 PROCEDURE — 82800 BLOOD PH: CPT

## 2024-06-23 PROCEDURE — 86900 BLOOD TYPING SEROLOGIC ABO: CPT | Performed by: OBSTETRICS & GYNECOLOGY

## 2024-06-23 PROCEDURE — 36004724 HC OB OR TIME LEV III - 1ST 15 MIN: Performed by: OBSTETRICS & GYNECOLOGY

## 2024-06-23 PROCEDURE — P9045 ALBUMIN (HUMAN), 5%, 250 ML: HCPCS | Mod: JZ,JG | Performed by: NURSE ANESTHETIST, CERTIFIED REGISTERED

## 2024-06-23 PROCEDURE — 87340 HEPATITIS B SURFACE AG IA: CPT | Performed by: OBSTETRICS & GYNECOLOGY

## 2024-06-23 PROCEDURE — 85025 COMPLETE CBC W/AUTO DIFF WBC: CPT | Performed by: OBSTETRICS & GYNECOLOGY

## 2024-06-23 PROCEDURE — 84295 ASSAY OF SERUM SODIUM: CPT

## 2024-06-23 PROCEDURE — 63600175 PHARM REV CODE 636 W HCPCS: Performed by: STUDENT IN AN ORGANIZED HEALTH CARE EDUCATION/TRAINING PROGRAM

## 2024-06-23 PROCEDURE — 25000003 PHARM REV CODE 250: Performed by: STUDENT IN AN ORGANIZED HEALTH CARE EDUCATION/TRAINING PROGRAM

## 2024-06-23 PROCEDURE — 71000033 HC RECOVERY, INTIAL HOUR: Performed by: OBSTETRICS & GYNECOLOGY

## 2024-06-23 PROCEDURE — 76805 OB US >/= 14 WKS SNGL FETUS: CPT

## 2024-06-23 PROCEDURE — P9021 RED BLOOD CELLS UNIT: HCPCS | Performed by: OBSTETRICS & GYNECOLOGY

## 2024-06-23 PROCEDURE — 84702 CHORIONIC GONADOTROPIN TEST: CPT | Performed by: OBSTETRICS & GYNECOLOGY

## 2024-06-23 PROCEDURE — 85379 FIBRIN DEGRADATION QUANT: CPT | Performed by: ANESTHESIOLOGY

## 2024-06-23 PROCEDURE — 36415 COLL VENOUS BLD VENIPUNCTURE: CPT | Mod: XB | Performed by: OBSTETRICS & GYNECOLOGY

## 2024-06-23 PROCEDURE — 59514 CESAREAN DELIVERY ONLY: CPT | Mod: 80,AT,, | Performed by: OBSTETRICS & GYNECOLOGY

## 2024-06-23 PROCEDURE — 59514 CESAREAN DELIVERY ONLY: CPT | Mod: AT,,, | Performed by: OBSTETRICS & GYNECOLOGY

## 2024-06-23 PROCEDURE — 51702 INSERT TEMP BLADDER CATH: CPT

## 2024-06-23 PROCEDURE — 71000039 HC RECOVERY, EACH ADD'L HOUR: Performed by: OBSTETRICS & GYNECOLOGY

## 2024-06-23 PROCEDURE — 25000003 PHARM REV CODE 250: Performed by: ANESTHESIOLOGY

## 2024-06-23 PROCEDURE — 85384 FIBRINOGEN ACTIVITY: CPT | Performed by: ANESTHESIOLOGY

## 2024-06-23 RX ORDER — HYDROCODONE BITARTRATE AND ACETAMINOPHEN 500; 5 MG/1; MG/1
TABLET ORAL
Status: DISCONTINUED | OUTPATIENT
Start: 2024-06-23 | End: 2024-06-23

## 2024-06-23 RX ORDER — OXYTOCIN-SODIUM CHLORIDE 0.9% IV SOLN 30 UNIT/500ML 30-0.9/5 UT/ML-%
10 SOLUTION INTRAVENOUS ONCE AS NEEDED
Status: DISCONTINUED | OUTPATIENT
Start: 2024-06-23 | End: 2024-06-23

## 2024-06-23 RX ORDER — DEXAMETHASONE SODIUM PHOSPHATE 4 MG/ML
INJECTION, SOLUTION INTRA-ARTICULAR; INTRALESIONAL; INTRAMUSCULAR; INTRAVENOUS; SOFT TISSUE
Status: DISCONTINUED | OUTPATIENT
Start: 2024-06-23 | End: 2024-06-23

## 2024-06-23 RX ORDER — ONDANSETRON 8 MG/1
8 TABLET, ORALLY DISINTEGRATING ORAL EVERY 8 HOURS PRN
Status: DISCONTINUED | OUTPATIENT
Start: 2024-06-23 | End: 2024-06-25 | Stop reason: HOSPADM

## 2024-06-23 RX ORDER — DIPHENHYDRAMINE HYDROCHLORIDE 50 MG/ML
12.5 INJECTION INTRAMUSCULAR; INTRAVENOUS
Status: DISCONTINUED | OUTPATIENT
Start: 2024-06-23 | End: 2024-06-23

## 2024-06-23 RX ORDER — PRENATAL WITH FERROUS FUM AND FOLIC ACID 3080; 920; 120; 400; 22; 1.84; 3; 20; 10; 1; 12; 200; 27; 25; 2 [IU]/1; [IU]/1; MG/1; [IU]/1; MG/1; MG/1; MG/1; MG/1; MG/1; MG/1; UG/1; MG/1; MG/1; MG/1; MG/1
1 TABLET ORAL DAILY
Status: DISCONTINUED | OUTPATIENT
Start: 2024-06-23 | End: 2024-06-25 | Stop reason: HOSPADM

## 2024-06-23 RX ORDER — ONDANSETRON HYDROCHLORIDE 2 MG/ML
4 INJECTION, SOLUTION INTRAVENOUS EVERY 6 HOURS PRN
Status: ACTIVE | OUTPATIENT
Start: 2024-06-23 | End: 2024-06-24

## 2024-06-23 RX ORDER — MISOPROSTOL 200 UG/1
400 TABLET ORAL EVERY 4 HOURS
Status: DISCONTINUED | OUTPATIENT
Start: 2024-06-23 | End: 2024-06-23

## 2024-06-23 RX ORDER — OXYTOCIN 10 [USP'U]/ML
INJECTION, SOLUTION INTRAMUSCULAR; INTRAVENOUS
Status: DISCONTINUED | OUTPATIENT
Start: 2024-06-23 | End: 2024-06-23

## 2024-06-23 RX ORDER — SODIUM CITRATE AND CITRIC ACID MONOHYDRATE 334; 500 MG/5ML; MG/5ML
30 SOLUTION ORAL ONCE
Status: DISCONTINUED | OUTPATIENT
Start: 2024-06-23 | End: 2024-06-23

## 2024-06-23 RX ORDER — CARBOPROST TROMETHAMINE 250 UG/ML
250 INJECTION, SOLUTION INTRAMUSCULAR
Status: DISCONTINUED | OUTPATIENT
Start: 2024-06-23 | End: 2024-06-23

## 2024-06-23 RX ORDER — ADHESIVE BANDAGE
30 BANDAGE TOPICAL 2 TIMES DAILY PRN
Status: DISCONTINUED | OUTPATIENT
Start: 2024-06-24 | End: 2024-06-25 | Stop reason: HOSPADM

## 2024-06-23 RX ORDER — CALCIUM CARBONATE 200(500)MG
500 TABLET,CHEWABLE ORAL 3 TIMES DAILY PRN
Status: DISCONTINUED | OUTPATIENT
Start: 2024-06-23 | End: 2024-06-23

## 2024-06-23 RX ORDER — OXYCODONE AND ACETAMINOPHEN 5; 325 MG/1; MG/1
1 TABLET ORAL EVERY 4 HOURS PRN
Status: DISCONTINUED | OUTPATIENT
Start: 2024-06-23 | End: 2024-06-23 | Stop reason: SDUPTHER

## 2024-06-23 RX ORDER — OXYTOCIN 10 [USP'U]/ML
10 INJECTION, SOLUTION INTRAMUSCULAR; INTRAVENOUS ONCE AS NEEDED
Status: DISCONTINUED | OUTPATIENT
Start: 2024-06-23 | End: 2024-06-23

## 2024-06-23 RX ORDER — FAMOTIDINE 10 MG/ML
20 INJECTION INTRAVENOUS ONCE
Status: DISCONTINUED | OUTPATIENT
Start: 2024-06-23 | End: 2024-06-23

## 2024-06-23 RX ORDER — MISOPROSTOL 200 UG/1
800 TABLET ORAL ONCE AS NEEDED
Status: DISCONTINUED | OUTPATIENT
Start: 2024-06-23 | End: 2024-06-23

## 2024-06-23 RX ORDER — AMOXICILLIN 250 MG
1 CAPSULE ORAL NIGHTLY PRN
Status: DISCONTINUED | OUTPATIENT
Start: 2024-06-23 | End: 2024-06-25 | Stop reason: HOSPADM

## 2024-06-23 RX ORDER — FENTANYL CITRATE 50 UG/ML
INJECTION, SOLUTION INTRAMUSCULAR; INTRAVENOUS
Status: DISCONTINUED | OUTPATIENT
Start: 2024-06-23 | End: 2024-06-23

## 2024-06-23 RX ORDER — PHENYLEPHRINE HYDROCHLORIDE 10 MG/ML
INJECTION INTRAVENOUS
Status: DISCONTINUED | OUTPATIENT
Start: 2024-06-23 | End: 2024-06-23

## 2024-06-23 RX ORDER — OXYCODONE HYDROCHLORIDE 5 MG/1
10 TABLET ORAL EVERY 4 HOURS PRN
Status: ACTIVE | OUTPATIENT
Start: 2024-06-23 | End: 2024-06-24

## 2024-06-23 RX ORDER — MUPIROCIN 20 MG/G
OINTMENT TOPICAL
Status: DISCONTINUED | OUTPATIENT
Start: 2024-06-23 | End: 2024-06-23

## 2024-06-23 RX ORDER — OXYCODONE AND ACETAMINOPHEN 10; 325 MG/1; MG/1
1 TABLET ORAL EVERY 4 HOURS PRN
Status: DISCONTINUED | OUTPATIENT
Start: 2024-06-23 | End: 2024-06-23 | Stop reason: SDUPTHER

## 2024-06-23 RX ORDER — SIMETHICONE 80 MG
1 TABLET,CHEWABLE ORAL EVERY 6 HOURS PRN
Status: DISCONTINUED | OUTPATIENT
Start: 2024-06-23 | End: 2024-06-25 | Stop reason: HOSPADM

## 2024-06-23 RX ORDER — SODIUM CITRATE AND CITRIC ACID MONOHYDRATE 334; 500 MG/5ML; MG/5ML
SOLUTION ORAL
Status: DISCONTINUED | OUTPATIENT
Start: 2024-06-23 | End: 2024-06-23

## 2024-06-23 RX ORDER — IBUPROFEN 600 MG/1
600 TABLET ORAL EVERY 6 HOURS
Status: DISCONTINUED | OUTPATIENT
Start: 2024-06-23 | End: 2024-06-25 | Stop reason: HOSPADM

## 2024-06-23 RX ORDER — OXYTOCIN 10 [USP'U]/ML
10 INJECTION, SOLUTION INTRAMUSCULAR; INTRAVENOUS ONCE AS NEEDED
Status: DISCONTINUED | OUTPATIENT
Start: 2024-06-23 | End: 2024-06-25 | Stop reason: HOSPADM

## 2024-06-23 RX ORDER — ONDANSETRON HYDROCHLORIDE 2 MG/ML
INJECTION, SOLUTION INTRAMUSCULAR; INTRAVENOUS
Status: DISCONTINUED | OUTPATIENT
Start: 2024-06-23 | End: 2024-06-23

## 2024-06-23 RX ORDER — FAMOTIDINE 10 MG/ML
INJECTION INTRAVENOUS
Status: DISCONTINUED | OUTPATIENT
Start: 2024-06-23 | End: 2024-06-23

## 2024-06-23 RX ORDER — ONDANSETRON 8 MG/1
8 TABLET, ORALLY DISINTEGRATING ORAL EVERY 8 HOURS PRN
Status: DISCONTINUED | OUTPATIENT
Start: 2024-06-23 | End: 2024-06-23

## 2024-06-23 RX ORDER — MISOPROSTOL 200 UG/1
800 TABLET ORAL ONCE AS NEEDED
Status: DISCONTINUED | OUTPATIENT
Start: 2024-06-23 | End: 2024-06-25 | Stop reason: HOSPADM

## 2024-06-23 RX ORDER — BUPIVACAINE HYDROCHLORIDE 7.5 MG/ML
INJECTION, SOLUTION EPIDURAL; RETROBULBAR
Status: COMPLETED | OUTPATIENT
Start: 2024-06-23 | End: 2024-06-23

## 2024-06-23 RX ORDER — OXYTOCIN-SODIUM CHLORIDE 0.9% IV SOLN 30 UNIT/500ML 30-0.9/5 UT/ML-%
95 SOLUTION INTRAVENOUS ONCE AS NEEDED
Status: DISCONTINUED | OUTPATIENT
Start: 2024-06-23 | End: 2024-06-25 | Stop reason: HOSPADM

## 2024-06-23 RX ORDER — SIMETHICONE 80 MG
1 TABLET,CHEWABLE ORAL 4 TIMES DAILY PRN
Status: DISCONTINUED | OUTPATIENT
Start: 2024-06-23 | End: 2024-06-23

## 2024-06-23 RX ORDER — LIDOCAINE HYDROCHLORIDE 10 MG/ML
10 INJECTION INFILTRATION; PERINEURAL ONCE AS NEEDED
Status: DISCONTINUED | OUTPATIENT
Start: 2024-06-23 | End: 2024-06-23

## 2024-06-23 RX ORDER — PROCHLORPERAZINE EDISYLATE 5 MG/ML
5 INJECTION INTRAMUSCULAR; INTRAVENOUS EVERY 6 HOURS PRN
Status: DISCONTINUED | OUTPATIENT
Start: 2024-06-23 | End: 2024-06-25 | Stop reason: HOSPADM

## 2024-06-23 RX ORDER — LORAZEPAM 2 MG/ML
2 INJECTION INTRAMUSCULAR
Status: DISCONTINUED | OUTPATIENT
Start: 2024-06-23 | End: 2024-06-23

## 2024-06-23 RX ORDER — SODIUM CHLORIDE, SODIUM LACTATE, POTASSIUM CHLORIDE, CALCIUM CHLORIDE 600; 310; 30; 20 MG/100ML; MG/100ML; MG/100ML; MG/100ML
INJECTION, SOLUTION INTRAVENOUS CONTINUOUS
Status: DISCONTINUED | OUTPATIENT
Start: 2024-06-23 | End: 2024-06-25 | Stop reason: HOSPADM

## 2024-06-23 RX ORDER — OXYTOCIN-SODIUM CHLORIDE 0.9% IV SOLN 30 UNIT/500ML 30-0.9/5 UT/ML-%
95 SOLUTION INTRAVENOUS ONCE
Status: DISCONTINUED | OUTPATIENT
Start: 2024-06-23 | End: 2024-06-25 | Stop reason: HOSPADM

## 2024-06-23 RX ORDER — ACETAMINOPHEN 10 MG/ML
INJECTION, SOLUTION INTRAVENOUS
Status: DISCONTINUED | OUTPATIENT
Start: 2024-06-23 | End: 2024-06-23

## 2024-06-23 RX ORDER — OXYTOCIN-SODIUM CHLORIDE 0.9% IV SOLN 30 UNIT/500ML 30-0.9/5 UT/ML-%
95 SOLUTION INTRAVENOUS ONCE
Status: DISCONTINUED | OUTPATIENT
Start: 2024-06-23 | End: 2024-06-23

## 2024-06-23 RX ORDER — CEFAZOLIN SODIUM 1 G/3ML
INJECTION, POWDER, FOR SOLUTION INTRAMUSCULAR; INTRAVENOUS
Status: DISCONTINUED | OUTPATIENT
Start: 2024-06-23 | End: 2024-06-23

## 2024-06-23 RX ORDER — MORPHINE SULFATE 0.5 MG/ML
INJECTION, SOLUTION EPIDURAL; INTRATHECAL; INTRAVENOUS
Status: DISCONTINUED | OUTPATIENT
Start: 2024-06-23 | End: 2024-06-23

## 2024-06-23 RX ORDER — METOCLOPRAMIDE HYDROCHLORIDE 5 MG/ML
INJECTION INTRAMUSCULAR; INTRAVENOUS
Status: DISCONTINUED | OUTPATIENT
Start: 2024-06-23 | End: 2024-06-23

## 2024-06-23 RX ORDER — AZITHROMYCIN 100 MG/ML
INJECTION, POWDER, LYOPHILIZED, FOR SOLUTION INTRAVENOUS
Status: DISCONTINUED | OUTPATIENT
Start: 2024-06-23 | End: 2024-06-23

## 2024-06-23 RX ORDER — OXYTOCIN-SODIUM CHLORIDE 0.9% IV SOLN 30 UNIT/500ML 30-0.9/5 UT/ML-%
10 SOLUTION INTRAVENOUS ONCE
Status: DISCONTINUED | OUTPATIENT
Start: 2024-06-23 | End: 2024-06-23

## 2024-06-23 RX ORDER — AZITHROMYCIN 250 MG/1
500 TABLET, FILM COATED ORAL DAILY
Status: DISCONTINUED | OUTPATIENT
Start: 2024-06-23 | End: 2024-06-23

## 2024-06-23 RX ORDER — DIPHENOXYLATE HYDROCHLORIDE AND ATROPINE SULFATE 2.5; .025 MG/1; MG/1
2 TABLET ORAL EVERY 6 HOURS PRN
Status: DISCONTINUED | OUTPATIENT
Start: 2024-06-23 | End: 2024-06-23

## 2024-06-23 RX ORDER — BISACODYL 10 MG/1
10 SUPPOSITORY RECTAL ONCE AS NEEDED
Status: DISCONTINUED | OUTPATIENT
Start: 2024-06-23 | End: 2024-06-25 | Stop reason: HOSPADM

## 2024-06-23 RX ORDER — CARBOPROST TROMETHAMINE 250 UG/ML
250 INJECTION, SOLUTION INTRAMUSCULAR
Status: DISCONTINUED | OUTPATIENT
Start: 2024-06-23 | End: 2024-06-25 | Stop reason: HOSPADM

## 2024-06-23 RX ORDER — DOCUSATE SODIUM 100 MG/1
200 CAPSULE, LIQUID FILLED ORAL 2 TIMES DAILY
Status: DISCONTINUED | OUTPATIENT
Start: 2024-06-23 | End: 2024-06-25 | Stop reason: HOSPADM

## 2024-06-23 RX ORDER — MUPIROCIN 20 MG/G
OINTMENT TOPICAL 2 TIMES DAILY
Status: DISCONTINUED | OUTPATIENT
Start: 2024-06-23 | End: 2024-06-25 | Stop reason: HOSPADM

## 2024-06-23 RX ORDER — TRANEXAMIC ACID 10 MG/ML
1000 INJECTION, SOLUTION INTRAVENOUS EVERY 30 MIN PRN
Status: DISCONTINUED | OUTPATIENT
Start: 2024-06-23 | End: 2024-06-23

## 2024-06-23 RX ORDER — ACETAMINOPHEN 325 MG/1
650 TABLET ORAL EVERY 6 HOURS
Status: COMPLETED | OUTPATIENT
Start: 2024-06-23 | End: 2024-06-24

## 2024-06-23 RX ORDER — LORAZEPAM 2 MG/ML
INJECTION INTRAMUSCULAR
Status: DISCONTINUED
Start: 2024-06-23 | End: 2024-06-23 | Stop reason: WASHOUT

## 2024-06-23 RX ORDER — DIPHENHYDRAMINE HCL 25 MG
25 CAPSULE ORAL EVERY 4 HOURS PRN
Status: DISCONTINUED | OUTPATIENT
Start: 2024-06-23 | End: 2024-06-25 | Stop reason: HOSPADM

## 2024-06-23 RX ORDER — METHYLERGONOVINE MALEATE 0.2 MG/ML
200 INJECTION INTRAVENOUS
Status: DISCONTINUED | OUTPATIENT
Start: 2024-06-23 | End: 2024-06-25 | Stop reason: HOSPADM

## 2024-06-23 RX ORDER — OXYTOCIN-SODIUM CHLORIDE 0.9% IV SOLN 30 UNIT/500ML 30-0.9/5 UT/ML-%
95 SOLUTION INTRAVENOUS ONCE AS NEEDED
Status: DISCONTINUED | OUTPATIENT
Start: 2024-06-23 | End: 2024-06-23

## 2024-06-23 RX ORDER — METHYLERGONOVINE MALEATE 0.2 MG/ML
200 INJECTION INTRAVENOUS ONCE AS NEEDED
Status: DISCONTINUED | OUTPATIENT
Start: 2024-06-23 | End: 2024-06-23

## 2024-06-23 RX ORDER — OXYTOCIN-SODIUM CHLORIDE 0.9% IV SOLN 30 UNIT/500ML 30-0.9/5 UT/ML-%
10 SOLUTION INTRAVENOUS ONCE AS NEEDED
Status: DISCONTINUED | OUTPATIENT
Start: 2024-06-23 | End: 2024-06-25 | Stop reason: HOSPADM

## 2024-06-23 RX ORDER — ALBUMIN HUMAN 50 G/1000ML
SOLUTION INTRAVENOUS
Status: DISCONTINUED | OUTPATIENT
Start: 2024-06-23 | End: 2024-06-23

## 2024-06-23 RX ORDER — TRANEXAMIC ACID 10 MG/ML
1000 INJECTION, SOLUTION INTRAVENOUS ONCE AS NEEDED
Status: DISCONTINUED | OUTPATIENT
Start: 2024-06-23 | End: 2024-06-25 | Stop reason: HOSPADM

## 2024-06-23 RX ORDER — OXYCODONE HYDROCHLORIDE 5 MG/1
5 TABLET ORAL EVERY 4 HOURS PRN
Status: ACTIVE | OUTPATIENT
Start: 2024-06-23 | End: 2024-06-24

## 2024-06-23 RX ORDER — MIDAZOLAM HYDROCHLORIDE 1 MG/ML
INJECTION INTRAMUSCULAR; INTRAVENOUS
Status: DISCONTINUED | OUTPATIENT
Start: 2024-06-23 | End: 2024-06-23

## 2024-06-23 RX ORDER — HYDROCODONE BITARTRATE AND ACETAMINOPHEN 500; 5 MG/1; MG/1
TABLET ORAL
Status: DISCONTINUED | OUTPATIENT
Start: 2024-06-23 | End: 2024-06-25 | Stop reason: HOSPADM

## 2024-06-23 RX ADMIN — SODIUM CHLORIDE, POTASSIUM CHLORIDE, SODIUM LACTATE AND CALCIUM CHLORIDE 1000 ML: 600; 310; 30; 20 INJECTION, SOLUTION INTRAVENOUS at 01:06

## 2024-06-23 RX ADMIN — OXYTOCIN 30 UNITS: 10 INJECTION, SOLUTION INTRAMUSCULAR; INTRAVENOUS at 12:06

## 2024-06-23 RX ADMIN — FENTANYL CITRATE 10 MCG: 0.05 INJECTION, SOLUTION INTRAMUSCULAR; INTRAVENOUS at 11:06

## 2024-06-23 RX ADMIN — METOCLOPRAMIDE 10 MG: 5 INJECTION, SOLUTION INTRAMUSCULAR; INTRAVENOUS at 11:06

## 2024-06-23 RX ADMIN — OXYTOCIN 30 UNITS: 10 INJECTION, SOLUTION INTRAMUSCULAR; INTRAVENOUS at 11:06

## 2024-06-23 RX ADMIN — PHENYLEPHRINE HYDROCHLORIDE 200 MCG: 10 INJECTION INTRAVENOUS at 12:06

## 2024-06-23 RX ADMIN — MORPHINE SULFATE 0.1 MG: 0.5 INJECTION, SOLUTION EPIDURAL; INTRATHECAL; INTRAVENOUS at 11:06

## 2024-06-23 RX ADMIN — ACETAMINOPHEN 650 MG: 325 TABLET ORAL at 10:06

## 2024-06-23 RX ADMIN — MIDAZOLAM HYDROCHLORIDE 2 MG: 1 INJECTION, SOLUTION INTRAMUSCULAR; INTRAVENOUS at 11:06

## 2024-06-23 RX ADMIN — SODIUM CHLORIDE, SODIUM LACTATE, POTASSIUM CHLORIDE, AND CALCIUM CHLORIDE: .6; .31; .03; .02 INJECTION, SOLUTION INTRAVENOUS at 11:06

## 2024-06-23 RX ADMIN — PHENYLEPHRINE HYDROCHLORIDE 200 MCG: 10 INJECTION INTRAVENOUS at 11:06

## 2024-06-23 RX ADMIN — SODIUM CHLORIDE, POTASSIUM CHLORIDE, SODIUM LACTATE AND CALCIUM CHLORIDE: 600; 310; 30; 20 INJECTION, SOLUTION INTRAVENOUS at 02:06

## 2024-06-23 RX ADMIN — AZITHROMYCIN MONOHYDRATE 500 MG: 500 INJECTION, POWDER, LYOPHILIZED, FOR SOLUTION INTRAVENOUS at 11:06

## 2024-06-23 RX ADMIN — ALBUMIN (HUMAN) 500 ML: 12.5 SOLUTION INTRAVENOUS at 01:06

## 2024-06-23 RX ADMIN — IBUPROFEN 600 MG: 600 TABLET ORAL at 11:06

## 2024-06-23 RX ADMIN — CEFAZOLIN 2 G: 2 INJECTION, POWDER, FOR SOLUTION INTRAMUSCULAR; INTRAVENOUS at 11:06

## 2024-06-23 RX ADMIN — ONDANSETRON 4 MG: 2 INJECTION INTRAMUSCULAR; INTRAVENOUS at 11:06

## 2024-06-23 RX ADMIN — SODIUM CHLORIDE: 0.9 INJECTION, SOLUTION INTRAVENOUS at 11:06

## 2024-06-23 RX ADMIN — SODIUM CHLORIDE: 0.9 INJECTION, SOLUTION INTRAVENOUS at 12:06

## 2024-06-23 RX ADMIN — SODIUM CITRATE AND CITRIC ACID MONOHYDRATE 30 ML: 500; 334 SOLUTION ORAL at 11:06

## 2024-06-23 RX ADMIN — ACETAMINOPHEN 1000 MG: 10 INJECTION, SOLUTION INTRAVENOUS at 12:06

## 2024-06-23 RX ADMIN — SODIUM CHLORIDE, SODIUM LACTATE, POTASSIUM CHLORIDE, AND CALCIUM CHLORIDE: .6; .31; .03; .02 INJECTION, SOLUTION INTRAVENOUS at 12:06

## 2024-06-23 RX ADMIN — SODIUM CHLORIDE: 9 INJECTION, SOLUTION INTRAVENOUS at 08:06

## 2024-06-23 RX ADMIN — DOCUSATE SODIUM 200 MG: 100 CAPSULE, LIQUID FILLED ORAL at 10:06

## 2024-06-23 RX ADMIN — SODIUM CHLORIDE, POTASSIUM CHLORIDE, SODIUM LACTATE AND CALCIUM CHLORIDE 1000 ML: 600; 310; 30; 20 INJECTION, SOLUTION INTRAVENOUS at 06:06

## 2024-06-23 RX ADMIN — MUPIROCIN: 20 OINTMENT TOPICAL at 10:06

## 2024-06-23 RX ADMIN — MISOPROSTOL 400 MCG: 200 TABLET ORAL at 09:06

## 2024-06-23 RX ADMIN — SODIUM CHLORIDE, POTASSIUM CHLORIDE, SODIUM LACTATE AND CALCIUM CHLORIDE 1000 ML: 600; 310; 30; 20 INJECTION, SOLUTION INTRAVENOUS at 07:06

## 2024-06-23 RX ADMIN — FENTANYL CITRATE 90 MCG: 50 INJECTION, SOLUTION INTRAMUSCULAR; INTRAVENOUS at 11:06

## 2024-06-23 RX ADMIN — DEXAMETHASONE SODIUM PHOSPHATE 4 MG: 4 INJECTION, SOLUTION INTRA-ARTICULAR; INTRALESIONAL; INTRAMUSCULAR; INTRAVENOUS; SOFT TISSUE at 12:06

## 2024-06-23 RX ADMIN — FAMOTIDINE 20 MG: 10 INJECTION, SOLUTION INTRAVENOUS at 11:06

## 2024-06-23 RX ADMIN — BUPIVACAINE HYDROCHLORIDE 1.6 ML: 7.5 INJECTION, SOLUTION EPIDURAL; RETROBULBAR at 11:06

## 2024-06-23 NOTE — HOSPITAL COURSE
Blood transfusion started  Patient sent to Labor and Delivery  BP better 98/50 after blood and fluid from ED  I came to evaluate patient  Blood at introitus  Cervix at 1.5 cm.  Long and somewhat soft    Patient with history of vaginal delivery of a term baby x 1, followed by CS x 2    Called by Nursing staff in patient's room.  Re. Heavy vaginal bleeding  About 500 cc at perineum on my arrival  Exam with cervix at 3 cm.  Vertex is high.  Patient in pain.  Discussed with patient and her mother regarding plan.  Would proceed with repeat  section.  Too much bleeding.  ?Abruption  Patient is status post 2 units pRBCs  Will give another unit now    [facility]  MD Regina Escobar MD  Anesthesia: Spinal by CINDA Campbell MD  Non-viable male infant at 1154 24  Weight: 2#7.2 1110 gm  Apgar: 0/0/0  Placenta: manually extracted intact with three vessels.  To Path  EBL: 500 cc  No complication    24 Doing much better.  Bleeding minimal.  Wants to get up.  Tolerating oral intake.      2024  Postpartum course was benign.  She is tolerating oral-feeding well.  Exam was benign with patient afebrile, vitals stable, and minimal bleeding.  Normal activities.  Patient discharged home on postpartum day #2, 2024   Discharge medications include Motrin, prenatal vitamins, and iron supplement.  Follow-up with me next week for dressing removal    Andrés Escobar MD.

## 2024-06-23 NOTE — ED TRIAGE NOTES
Pt presents to ED w/ c/o acute onset severe pelvic pain, vaginal bleeding and nausea with one episode of emesis. Per pt, she is unsure if she is pregnant or not, but her last cycle was May 20, 2024. Pt is unsure of date of last cycle before May. Pt AAOx4 with stable vitals upon arrival to ED.

## 2024-06-23 NOTE — ED NOTES
Pt pressed emergency button on wall in bathroom, I arrived in bathroom less than one minute after help button was pressed. Pt AAOx4, but I could tell that pt was now hypotensive and noticed pale mucous membranes in mouth and pale tongue. Pt immediately placed of VS monitor to measure BP, HR and oxygen saturations. BP unable to be measure and O2 sats decreasing on monitor. Multiple nurses immediately to bedside along with MD Selena and IHSAN Sandoval

## 2024-06-23 NOTE — Clinical Note
Transfer To (Destination): [facility] LABOR AND DELIVERY [[id number]]   Diagnosis: Vaginal bleeding [[id number]]   Future Attending Provider: NORIS PEREZ [[id number]]

## 2024-06-23 NOTE — NURSING
"Callback recvd from Roxanna Chaudhari.  states case is "not reportable". No case number given.   " Immediate family member

## 2024-06-23 NOTE — L&D DELIVERY NOTE
[facility] - [place] - Labor & Delivery   Section   Operative Note    SUMMARY     Date of Procedure: 2024       PREOPERATIVE DIAGNOSES:  1.  Intrauterine pregnancy at 27w6d.  2.  No prenatal care  3.  Fetal demise  4.  Severe symptomatic anemia  5.  Heavy vaginal bleeding  6.  Previous  section.     POSTOPERATIVE DIAGNOSES:  1.  Intrauterine pregnancy at 27w6d.  2.  No prenatal care  3.  Fetal demise  4.  Severe symptomatic anemia  5.  Heavy vaginal bleeding  6.  Previous  section.     PROCEDURE:  Repeat low transverse  section.     SURGEON:  Andrés Escobar M.D.     ASSISTANT:  Haylee Tapia M.D.     ANESTHESIA:  Spinal by CINDA Campbell MD     BLOOD LOSS:  About 500 mL.     URINE:  Clear.     FINDINGS:  Non-viable male infant delivered from vertex position at 1154 on 24.  Weight is 2#7.2 or 1110 gm.  Apgar was 0 at 1 minute and 0 at 5 minutes.     OTHER FINDINGS:  Include normal tubes and ovaries bilaterally.     COMPLICATIONS:  None.     SPECIMEN:  None.     HISTORY:  The patient is a 29 year-old  with above diagnoses.  Admitted for delivery.  Repeat low transverse  section performed for heavy bleeding, severe anemia, status post blood transfusion, remote from delivery.  Risks and benefits discussed.     PROCEDURE IN DETAIL:  After confirming appropriate consent was signed in chart, the patient was then transported to the OR.  Spinal anesthesia per Anesthesia.  The patient was then put in a supine position, prepped and draped.  Adequate anesthesia was verified with negative Allis test to the umbilicus.  A Pfannenstiel skin incision was then made with the scalpel, extended down to the fascia.  Fascia was then entered sharply and extended bilaterally sharply.  Peritoneum was then identified and entered bluntly and sharply.  The lower uterine segment was then identified.  Hysterotomy was performed using the scalpel transversely and amniotomy performed bluntly without any  difficulty.  Clear fluid noted.  The uterine incision was then extended bluntly using the surgeon's finger.  A non-viable male infant delivered from vertex position without any difficulty, and handed off to the nurse practitioner in attendance.  Birth time was 1154 on 24.  Weight was 2#7.2 or 1110 gm.  Apgar was 0 at 1 minute and 0 at 5 minutes.  Cord blood was then obtained.  Placenta was then manually extracted intact.  Uterus was then delivered into the abdominal incision.  Endometrial cavity was then wiped clean with wet laps.  Uterine incision was then repaired in 2 layers using #0 Monocryl in a running interlocking fashion with the second layer in an imbricating manner.  Good hemostasis was noted.  Again, normal tubes and ovaries bilaterally.  Uterus was then replaced back to the abdomen.  Good hemostasis was noted.  Peritoneum was then closed using #3-0 Vicryl, fascia was then closed using #1 Vicryl in a running nonlocking fashion.  Subcutaneous tissue was then noted to be in good hemostasis.  It was then reapproximated using #3-0 Vicryl and then the skin was then reapproximated using #3-0 Vicryl on a Michael needle and a pressure bandage applied with the Aquacel dressing.  The patient was then transferred to the Recovery Room in stable condition.         Specimens:   Specimen (24h ago, onward)       Start     Ordered    24 1415  Specimen to Pathology, Surgery Gynecology and Obstetrics  Once        Comments: Pre-op Diagnosis: Pregnancy [Z34.90]Procedure(s): SECTION Number of specimens: 1Name of specimens: placentaMedical hx: , no prenatal care, 27w6d fetal demise, severe symptomatic anemia, receiving blood transfusions     References:    Click here for ordering Quick Tip   Question Answer Comment   Procedure Type: Gynecology and Obstetrics    Specimen Class: Routine/Screening    Release to patient Immediate        24 5583                    Condition: Good    VTE Risk Mitigation  "(From admission, onward)           Ordered     IP VTE HIGH RISK PATIENT  Once         24 1453     Place sequential compression device  Until discontinued         24 1453                    Disposition: PACU - hemodynamically stable.    Attestation: Good         Delivery Information for Robles Matamoros    Birth information:  YOB: 2024   Time of birth: 11:54 AM   Sex: male   Head Delivery Date/Time: 2024 11:54 AM   Delivery type: , Low Transverse   Gestational Age: Unknown        Delivery Providers    Delivering clinician: Andrés Escobar MD   Provider Role    Madison Morin RN Santiago, Felicia, Bc Perez, Kyara Gregg MD Lewis, Melissa, Jamliah Gutierrez, Haylee Velazquez MD Assisting Surgeon              Measurements    Weight: 1110 g  Weight (lbs): 2 lb 7.2 oz  Length: 36.8 cm  Length (in): 14.5"  Head circumference: 25.5 cm  Chest circumference: 21 cm  Abdominal girth: 22 cm         Apgars    Living status: Fetal Demise  Apgar Component Scores:  1 min.:  5 min.:  10 min.:  15 min.:  20 min.:    Skin color:  0  0  0  0  0    Heart rate:  0  0  0  0  0    Reflex irritability:  0  0  0  0  0    Muscle tone:  0  0  0  0  0    Respiratory effort:  0  0  0  0  0    Total:  0  0  0  0  0    Apgars assigned by: DR. ESCOBAR         Operative Delivery    Forceps attempted?: No  Vacuum extractor attempted?: No         Shoulder Dystocia    Shoulder dystocia present?: No           Presentation    Presentation: Vertex  Position: Middle Occiput Anterior           Interventions/Resuscitation    Method: None, Other       Cord    Vessels: 3 vessels  Complications: None  Delayed Cord Clamping?: No  Cord Clamped Date/Time: 2024 11:54 AM  Gases Sent?: No  Cord Comments: no blood in umbilical cord; no cord blood collected  Stem Cell Collection (by MD): No       Placenta    Placenta delivery date/time: 2024 1155  Placenta removal: " Manual removal  Placenta appearance: Intact  Placenta disposition: Pathology           Labor Events:       labor: Yes     Labor Onset Date/Time: 2024 06:30     Dilation Complete Date/Time:         Start Pushing Date/Time:         Start Pushing Date/Time:       Rupture Date/Time:            Rupture type:          Fluid Amount:       Fluid Color:                steroids: None     Antibiotics given for GBS: No     Induction:       Indications for induction:        Augmentation:       Indications for augmentation:       Labor complications: Other Excessive Bleeding     Additional complications: Iufd At 20 Weeks Or More Of Gestation        Cervical ripening:                     Delivery:      Episiotomy: None     Indication for Episiotomy:       Perineal Lacerations: None Repaired:      Periurethral Laceration:   Repaired:     Labial Laceration:   Repaired:     Sulcus Laceration:   Repaired:     Vaginal Laceration:   Repaired:     Cervical Laceration:   Repaired:     Repair suture: None     Repair # of packets:       Last Value - EBL - Nursing (mL):       Sum - EBL - Nursing (mL): 0     Last Value - EBL - Anesthesia (mL):      Calculated QBL (mL): 330      Running total QBL (mL): 514      Vaginal Sweep Performed: No     Surgicount Correct: Yes     Vaginal Packing:   Quantity:       Other providers:       Anesthesia    Method: Spinal          Details (if applicable):  Trial of Labor Yes    Categorization: Repeat    Priority: Emergency   Indications for : Other (Add Comments)   Incision Type: low transverse     Additional  information:  Forceps:    Vacuum:    Breech:    Observed anomalies    Other (Comments): IUFD at approx 27w6d per u/s performed on 24. Pt had prev vag delivery x1 and prev c/s x2. Pt had trial of labor (cytotec given) and began having excessive bleeding. Decision was made to have an emergent c/s.

## 2024-06-23 NOTE — H&P
[facility] - [place] - Labor & Delivery  Obstetrics  History & Physical    Patient Name: Kamila Matamoros  MRN: [id number]  Admission Date: 2024  Primary Care Provider: No, Primary Doctor    Subjective:     Principal Problem:Fetal demise, greater than 22 weeks, antepartum, single gestation    History of Present Illness:  28 yo    Presented to our ED with vaginal bleeding  Last menstrual period was reportedly May 20, 2024 and last vaginal delivery was 2023   Acute onset of lower abdominal cramping and heavy vaginal bleeding 8 hours prior to presentation.   In our Emergency Department, blood seen at introitus  Syncope.  Hypotensive with BP as low as 75/47  H/H at 5.7/18.7  Ultrasound with fetal demise at about 27 weeks  I was then called      Obstetric HPI:  Patient reports None contractions but she has had abdominal pain,  no  fetal movement, Yes vaginal bleeding , No loss of fluid     This pregnancy has been complicated by no prenatal care, now with fetal demise    OB History    Para Term  AB Living   4 2 2 0 0 2   SAB IAB Ectopic Multiple Live Births   0 0 0 0 0      # Outcome Date GA Lbr Doc/2nd Weight Sex Type Anes PTL Lv   4 Current            3       CS-LTranv      2 Term      CS-LTranv      1 Term      Vag-Spont        History reviewed. No pertinent past medical history.  Past Surgical History:   Procedure Laterality Date     SECTION         PTA Medications   Medication Sig    acetaminophen (TYLENOL ORAL) Take by mouth.    diphenhydrAMINE (BENADRYL) 25 mg capsule Take 1 capsule (25 mg total) by mouth 3 (three) times daily as needed (Severe headache--take with 10mg of Compazine).    prochlorperazine (COMPAZINE) 10 MG tablet Take 1 tablet (10 mg total) by mouth 3 (three) times daily as needed (Severe headache--take with 25mg of Benadryl).       Review of patient's allergies indicates:  No Known Allergies     Family History    None       Tobacco Use    Smoking status:  Never    Smokeless tobacco: Not on file   Substance and Sexual Activity    Alcohol use: No    Drug use: No    Sexual activity: Yes     Partners: Male     Review of Systems   Objective:     Vital Signs (Most Recent):  Temp: 98 °F (36.7 °C) (24)  Pulse: 85 (24)  Resp: 17 (24)  BP: (!) 98/50 (24)  SpO2: 95 % (24) Vital Signs (24h Range):  Temp:  [97.9 °F (36.6 °C)-98.6 °F (37 °C)] 98 °F (36.7 °C)  Pulse:  [68-96] 85  Resp:  [16-34] 17  SpO2:  [95 %-99 %] 95 %  BP: ()/(46-54) 98/50     Weight: 106.6 kg (235 lb)  Body mass index is 37.93 kg/m².    FHT: None   TOCO:  irregular     Physical Exam     Cervix:  Dilation:  1  Effacement:  25%  Station: -3  Presentation: Vertex     Significant Labs:  Lab Results   Component Value Date    [facility]       I have personallly reviewed all pertinent lab results from the last 24 hours.  Assessment/Plan:     29 y.o. female  at Unknown for:    * Fetal demise, greater than 22 weeks, antepartum, single gestation  Now with fetal demise at 27 weeks size.  No prenatal care  Will start labor induction with Cytotec  Blood transfusion  Monitor blood pressure and vitals  Expect  later today    Previous  section complicating pregnancy, antepartum condition or complication  Now with fetal demise at 27 weeks size.  No prenatal care  Will start labor induction with Cytotec  Blood transfusion  Monitor blood pressure and vitals  Expect  later today    27 weeks gestation of pregnancy  Now with fetal demise  Will start labor induction with Cytotec  Blood transfusion  Monitor blood pressure and vitals  Expect  later today        Andrés Escobar MD  Obstetrics  [facility] - Labor & Delivery

## 2024-06-23 NOTE — NURSING
Mother wishes to hold baby. Mother tearful but accepting. Support provided. Pt wishes to be left alone and spend time with baby and her family. Mother's wishes granted and instructed to call RN when ready. Callbell within reach.

## 2024-06-23 NOTE — CARE UPDATE
Called by Nursing staff in patient's room.  Re. Heavy vaginal bleeding  About 500 cc at perineum on my arrival  Exam with cervix at 3 cm.  Vertex is high.  Patient in pain.  Discussed with patient and her mother regarding plan.  Would proceed with repeat  section.  Too much bleeding.  ?Abruption  Patient is status post 2 units pRBCs  Will give another unit now

## 2024-06-23 NOTE — NURSING
Call placed to JAY JAY 's office. Roxanna Chaudhari will call back to obtain information on fetal demise. Spoke to Kathleen.

## 2024-06-23 NOTE — NURSING
RN called  and NAYELI numerous times and was unable to speak to a representative. Nightshift Charge RN notified and will have a dayshift RN call tomorrow during normal business hours.

## 2024-06-23 NOTE — ASSESSMENT & PLAN NOTE
Now with fetal demise at 27 weeks size.  No prenatal care  Will start labor induction with Cytotec  Blood transfusion  Monitor blood pressure and vitals  Expect  later today

## 2024-06-23 NOTE — ED PROVIDER NOTES
Encounter Date: 2024       History     Chief Complaint   Patient presents with    Vaginal Bleeding     Pt presents to the ED with c/o vaginal bleeding and pelvic pain with one episode of emesis since today. Pt thinks she may be 2 months pregnant. 100ml NS administered by EMS.     29 y.o. female with history below presents for evaluation of vaginal bleeding.  Patient is  whose last menstrual period was reportedly May 20, 2024 and last vaginal delivery was 2023 presents emergency room for evaluation of vaginal bleeding.  Patient tells me that she had acute onset of lower abdominal cramping and heavy vaginal bleeding 8 hours prior to presentation.    Triage vitals were reviewed and are: Initial Vitals [24 0456]  BP: (!) 108/54  Pulse: 88  Resp: 16  Temp: 98.6 °F (37 °C)  SpO2: 99 %  MAP: n/a      The Patient:   has no past medical history on file.   has a past surgical history that includes  section.   reports that she has never smoked. She does not have any smokeless tobacco history on file. She reports that she does not drink alcohol and does not use drugs.  Has allergies listed as: Patient has no known allergies.    Current Outpatient Medications:  acetaminophen (TYLENOL ORAL), Take by mouth., Disp: , Rfl:   diphenhydrAMINE (BENADRYL) 25 mg capsule, Take 1 capsule (25 mg total) by mouth 3 (three) times daily as needed (Severe headache--take with 10mg of Compazine)., Disp: 10 capsule, Rfl: 0  ibuprofen (ADVIL,MOTRIN) 600 MG tablet, Take 1 tablet (600 mg total) by mouth every 6 (six) hours as needed for Pain., Disp: 20 tablet, Rfl: 0  ibuprofen (ADVIL,MOTRIN) 600 MG tablet, Take 1 tablet (600 mg total) by mouth every 6 (six) hours as needed for Pain., Disp: 12 tablet, Rfl: 0  prochlorperazine (COMPAZINE) 10 MG tablet, Take 1 tablet (10 mg total) by mouth 3 (three) times daily as needed (Severe headache--take with 25mg of Benadryl)., Disp: 10 tablet, Rfl: 0                  The  history is provided by the patient. The history is limited by a language barrier. A  was used.     Review of patient's allergies indicates:  No Known Allergies  History reviewed. No pertinent past medical history.  Past Surgical History:   Procedure Laterality Date     SECTION       No family history on file.  Social History     Tobacco Use    Smoking status: Never   Substance Use Topics    Alcohol use: No    Drug use: No     Review of Systems   Constitutional:  Negative for chills, fatigue and fever.   HENT:  Negative for congestion, hearing loss, sore throat and trouble swallowing.    Eyes:  Negative for visual disturbance.   Respiratory:  Negative for cough, chest tightness and shortness of breath.    Cardiovascular:  Negative for chest pain.   Gastrointestinal:  Positive for abdominal pain. Negative for nausea.   Endocrine: Negative for polyuria.   Genitourinary:  Positive for vaginal bleeding. Negative for difficulty urinating.   Musculoskeletal:  Negative for arthralgias and myalgias.   Skin:  Negative for rash.   Neurological:  Negative for dizziness and headaches.   Psychiatric/Behavioral:  The patient is not nervous/anxious.        Physical Exam     Initial Vitals [24 0456]   BP Pulse Resp Temp SpO2   (!) 108/54 88 16 98.6 °F (37 °C) 99 %      MAP       --         Physical Exam    Nursing note and vitals reviewed.  Constitutional:   Acutely ill-appearing, pale, hypotensive, diaphoretic   HENT:   Head: Normocephalic and atraumatic.   Pale mucous membranes   Eyes: Conjunctivae and EOM are normal.   Neck: Neck supple.   Cardiovascular:  Regular rhythm, normal heart sounds and intact distal pulses.           Tachycardic   Pulmonary/Chest: Breath sounds normal. No respiratory distress.   Genitourinary:    Genitourinary Comments: Rapid pelvic in the setting of hypotensive patient with vaginal bleeding performed with nurse chaperone in clear view.    There is gross vaginal  hemorrhage.  Unable to fully visualize intravaginal contents.  Manual exam reveals approximately 2-3 cm cervical dilatation     Musculoskeletal:         General: Normal range of motion.      Cervical back: Neck supple.     Neurological: She is alert and oriented to person, place, and time. GCS score is 15. GCS eye subscore is 4. GCS verbal subscore is 5. GCS motor subscore is 6.   Skin: Capillary refill takes 2 to 3 seconds. There is pallor.   Psychiatric: She has a normal mood and affect. Her behavior is normal. Judgment and thought content normal.         ED Course   Critical Care    Date/Time: 6/23/2024 6:06 AM    Performed by: Ángel Marshall PA-C  Authorized by: Bhupinder Walters MD  Direct patient critical care time: 32 minutes  Additional history critical care time: 7 minutes  Ordering / reviewing critical care time: 8 minutes  Documentation critical care time: 6 minutes  Consulting other physicians critical care time: 8 minutes  Total critical care time (exclusive of procedural time) : 61 minutes  Critical care time was exclusive of separately billable procedures and treating other patients.  Critical care was necessary to treat or prevent imminent or life-threatening deterioration of the following conditions: cardiac failure, circulatory failure and shock.  Critical care was time spent personally by me on the following activities: development of treatment plan with patient or surrogate, gastric intubation, interpretation of cardiac output measurements, evaluation of patient's response to treatment, examination of patient, obtaining history from patient or surrogate, ordering and performing treatments and interventions, ordering and review of laboratory studies, ordering and review of radiographic studies, pulse oximetry and re-evaluation of patient's condition.        Labs Reviewed   CBC W/ AUTO DIFFERENTIAL - Abnormal; Notable for the following components:       Result Value    RBC 2.49 (*)      Hemoglobin 5.7 (*)     Hematocrit 18.7 (*)     MCV 75 (*)     MCH 22.9 (*)     MCHC 30.5 (*)     RDW 16.7 (*)     Immature Granulocytes 0.8 (*)     Gran # (ANC) 9.5 (*)     Immature Grans (Abs) 0.09 (*)     Lymph # 0.8 (*)     Gran % 88.5 (*)     Lymph % 7.8 (*)     Mono % 2.6 (*)     All other components within normal limits    Narrative:     Hemoglobin, Hematocrit critical result(s) called and verbal readback   obtained from Mae Chacon @6:17am; 6/23/2024 by [employer] 06/23/2024   06:17   ISTAT PROCEDURE - Abnormal; Notable for the following components:    POC PCO2 29.0 (*)     POC HCO3 17.0 (*)     POC BE -7 (*)     POC TCO2 18 (*)     All other components within normal limits   ISTAT PROCEDURE - Abnormal; Notable for the following components:    POC Glucose 169 (*)     POC TCO2 (MEASURED) 17 (*)     POC Hematocrit 22 (*)     All other components within normal limits   HCG, QUANTITATIVE          Imaging Results               US OB Limited 1 Or More Gestations (Final result)  Result time 06/23/24 06:07:31   Procedure changed from US OB <14 Wks TransAbd & TransVag, Single Gestation (XPD)     Final result by Akira Seals MD (06/23/24 06:07:31)                   Impression:      Limited fetal ultrasound demonstrating average ultrasound gestational age 27 weeks 6 days.    Technologist notes no fetal movement and no fetal heart tones.  Confirmation/correlation suggested.  OBGYN correlation/evaluation recommended.    This report was flagged in Epic as abnormal.        Electronically signed by: Akira Seals MD  Date:    06/23/2024  Time:    06:07               Narrative:    EXAMINATION:  US OB LIMITED 1 OR MORE GESTATIONS    CLINICAL HISTORY:  pain;    TECHNIQUE:  Markedly limited examination to evaluate for heart tones.    COMPARISON:  None    FINDINGS:  Single cephalic presentation identified.  BPD corresponds with 27 weeks 3 days, head circumference corresponds with 27 weeks 4 days, abdominal circumference  corresponds with 28 weeks 3 days.    Technologist notes no fetal movement and no fetal heart tones.                                       Medications   oxytocin 30 units in 500 mL normal saline infusion (95 aneta-units/min Intravenous Not Given 6/23/24 1500)   mupirocin 2 % ointment (has no administration in time range)   lanolin cream (has no administration in time range)   ondansetron disintegrating tablet 8 mg (has no administration in time range)   senna-docusate 8.6-50 mg per tablet 1 tablet (has no administration in time range)   docusate sodium capsule 200 mg (has no administration in time range)   simethicone chewable tablet 80 mg (has no administration in time range)   diphenhydrAMINE capsule 25 mg (has no administration in time range)   prenatal vitamin oral tablet (1 tablet Oral Not Given 6/23/24 1500)   magnesium hydroxide 400 mg/5 ml suspension 2,400 mg (has no administration in time range)   bisacodyL suppository 10 mg (has no administration in time range)   measles, mumps and rubella vaccine 1,000-12,500 TCID50/0.5 mL injection 0.5 mL (has no administration in time range)   Tdap (BOOSTRIX) vaccine injection 0.5 mL (has no administration in time range)   oxytocin 30 units in 500 mL normal saline infusion (loading dose) (has no administration in time range)   oxytocin 30 units in 500 mL normal saline infusion (has no administration in time range)   oxytocin injection 10 Units (has no administration in time range)   miSOPROStoL tablet 800 mcg (has no administration in time range)   miSOPROStoL tablet 800 mcg (has no administration in time range)   methylergonovine injection 200 mcg (has no administration in time range)   carboprost injection 250 mcg (has no administration in time range)   tranexamic acid in NaCl,iso-os IVPB 1,000 mg (has no administration in time range)   ibuprofen tablet 600 mg (has no administration in time range)   acetaminophen tablet 650 mg (has no administration in time range)    oxyCODONE immediate release tablet 5 mg (has no administration in time range)   oxyCODONE immediate release tablet 10 mg (has no administration in time range)   ondansetron injection 4 mg (has no administration in time range)   prochlorperazine injection Soln 5 mg (has no administration in time range)   0.9%  NaCl infusion (for blood administration) (has no administration in time range)   lactated ringers infusion ( Intravenous New Bag 24 1429)   lactated ringers bolus 1,000 mL (1,000 mLs Intravenous New Bag 24 0601)   lactated ringers bolus 1,000 mL (1,000 mLs Intravenous New Bag 24 0741)   lactated ringers bolus 1,000 mL (0 mLs Intravenous Stopped 24 1448)     Medical Decision Making  Encounter Date: 2024    ED Course  29 y.o. female presents for evaluation of vaginal bleeding.  On initial presentation, patient is ill-appearing, pale and hypotensive.  Afebrile. Phonating and protecting the airway spontaneously. No clinical evidence for impending airway compromise. Examination as above. Vitals range:   Temp:  [97.9 °F (36.6 °C)-98.6 °F (37 °C)] 98 °F (36.7 °C)  Pulse:  [68-96] 68  Resp:  [16-34] 16  SpO2:  [99 %] 99 %  BP: ()/(46-54) 83/47    Differential diagnoses includes but is not limited to:  Hemorrhage, hemorrhagic shock, vaginal bleeding in pregnancy,     The patient's list of activation medications and allergies as documented per RN staff has been reviewed and is charted in the HPI.    Clinical Impression:  Final diagnoses:  [O46.90] Vaginal bleeding in pregnancy (Primary)  [O09.32] Insufficient prenatal care in second trimester  [N93.9] Vaginal hemorrhage  [E86.1] Hypotension due to hypovolemia           Clinical picture today most consistent with the impression above.  Patient is a 29-year-old female  who recently discovered is EGA 27 weeks by head circumference presenting for vaginal bleeding.  Patient is initially ill-appearing, hypotensive, pale and  diaphoretic.  Limited pelvic demonstrating gross vaginal bleeding and cervical dilation to approximately 2 cm.  Given the clinical history of vaginal bleeding for 8 hours, decision was made to transfuse emergency blood.  Prep 2 units, administer 1 unit.  Patient also received a proximally 2 L IVF prior to this.  OBGYN, Dr. Escobar, was promptly consulted and informed with the patient's presentation, ultrasound demonstrating 27 weeks EGA with no fetal heart tones.  Patient was subsequently stabilized and sent to labor and delivery with blood transfusing.     DISCLAIMER: This note was prepared with [employer] voice recognition transcription software. Garbled syntax, mangled pronouns, and other bizarre constructions may be attributed to that software system.      Amount and/or Complexity of Data Reviewed  Labs: ordered.     Details: Point of care hematocrit 22.  Pending remainder of CBC, CMP, type and screen  Radiology: ordered. Decision-making details documented in ED Course.    Risk  Prescription drug management.               ED Course as of 06/23/24 1956   [place] Jun 23, 2024   0530 BP(!): 108/54 [AN]   0531 Temp: 98.6 °F (37 °C) [AN]   0531 Pulse: 88 [AN]   0531 Resp: 16 [AN]   0531 SpO2: 99 % [AN]   0612 US OB Limited 1 Or More Gestations(!)  Limited fetal ultrasound demonstrating average ultrasound gestational age 27 weeks 6 days.     Technologist notes no fetal movement and no fetal heart tones.  Confirmation/correlation suggested.  OBGYN correlation/evaluation recommended.   [AN]   0618 I spoke on the phone with Dr. Escobar, who recommends sending the patient up to L and D as soon as possible.  I discussed that I would like to get the patient's hemodynamics improved prior to transferring up to labor and delivery.  Patient receiving 1 unit of blood in 2 L of lactated Ringer's for hypotension and vaginal hemorrhage.  Patient's blood pressure improved.  We are transferring to labor and delivery for further treatment. [CC]       ED Course User Index  [AN] Ángel Marshall PA-C  [CC] Bhupinder Walters MD                           Clinical Impression:  Final diagnoses:  [O46.90] Vaginal bleeding in pregnancy  [O09.32] Insufficient prenatal care in second trimester  [N93.9] Vaginal hemorrhage  [E86.1] Hypotension due to hypovolemia          ED Disposition Condition    Send to L&D Stable                       Ángel Marshall PA-C  06/23/24 1956

## 2024-06-23 NOTE — TRANSFER OF CARE
"Anesthesia Transfer of Care Note    Patient: Kamila Matamoros    Procedure(s) Performed: Procedure(s) (LRB):   SECTION (N/A)    Patient location: Labor and Delivery    Anesthesia Type: spinal    Transport from OR: Transported from OR on room air with adequate spontaneous ventilation    Post pain: adequate analgesia    Post assessment: no apparent anesthetic complications and tolerated procedure well    Post vital signs: stable    Level of consciousness: awake, alert and oriented    Nausea/Vomiting: no nausea/vomiting    Complications: none    Transfer of care protocol was followed      Last vitals: Visit Vitals  BP (!) 97/52   Pulse 64   Temp 37.1 °C (98.7 °F) (Oral)   Resp 18   Ht 5' 6" (1.676 m)   Wt 106.6 kg (235 lb)   SpO2 98%   Breastfeeding No   BMI 37.93 kg/m²     "

## 2024-06-23 NOTE — NURSING
This RN and Dr. Escobar at bedside. MD explained u/s results and POC to pt using  [id number]. Pt verbalized understanding with good recall noted. Questions answered. Orders for admission for cytotec IOL for fetal demise.

## 2024-06-23 NOTE — HPI
28 yo    Presented to our ED with vaginal bleeding  Last menstrual period was reportedly May 20, 2024 and last vaginal delivery was 2023   Acute onset of lower abdominal cramping and heavy vaginal bleeding 8 hours prior to presentation.   In our Emergency Department, blood seen at introitus  Syncope.  Hypotensive with BP as low as 75/47  H/H at 5.7/18.7  Ultrasound with fetal demise at about 27 weeks  I was then called

## 2024-06-23 NOTE — ANESTHESIA PROCEDURE NOTES
Spinal    Diagnosis: IUP   Patient location during procedure: OR  Start time: 6/23/2024 11:34 AM  Timeout: 6/23/2024 11:34 AM  End time: 6/23/2024 11:37 AM    Staffing  Authorizing Provider: Kyara Campbell MD  Performing Provider: Kyara Campbell MD    Staffing  Performed by: Kyara Campbell MD  Authorized by: Kyara Campbell MD    Preanesthetic Checklist  Completed: patient identified, IV checked, site marked, risks and benefits discussed, surgical consent, monitors and equipment checked, pre-op evaluation and timeout performed  Spinal Block  Patient position: sitting  Prep: ChloraPrep  Patient monitoring: heart rate, continuous pulse ox and frequent blood pressure checks  Approach: midline  Location: L3-4  Injection technique: single shot  CSF Fluid: clear free-flowing CSF  Needle  Needle type: pencil-tip   Needle gauge: 25 G  Needle length: 3.5 in  Additional Documentation: incremental injection, no paresthesia on injection and negative aspiration for heme  Needle localization: anatomical landmarks  Assessment  Sensory level: T4   Dermatomal levels determined by pinch or prick  Ease of block: easy  Patient's tolerance of the procedure: comfortable throughout block  Medications:    Medications: bupivacaine (pf) (MARCAINE) injection 0.75% - Intraspinal   1.6 mL - 6/23/2024 11:37:00 AM

## 2024-06-23 NOTE — ED NOTES
Pt requesting to use restroom. Pt ambulated successfully to restroom with minimal help from nurse.

## 2024-06-23 NOTE — NURSING
RN to bedside to give Notice of Parental Rights form and discuss options for fetal remains. Mother wishes to not have an autopsy or genetic testing performed. Mother wishes to have the baby buried and will speak to her family about choosing a  home. Pt aware that she needs to make a decision within 48hrs. Pt states she will discuss with her family now and let RN know what she decides.

## 2024-06-23 NOTE — NURSING
Anesthesia notified that pt wants epidural. Labs reviewed with Dr. Campbell. States will come to bedside to speak to pt about options for pain control.

## 2024-06-23 NOTE — ANESTHESIA PREPROCEDURE EVALUATION
2024    Pre-operative evaluation for * No surgery found *    Kamila Matamoros is a 29 y.o. female     Patient Active Problem List   Diagnosis    Fetal demise, greater than 22 weeks, antepartum, single gestation    27 weeks gestation of pregnancy    Previous  section complicating pregnancy, antepartum condition or complication       Review of patient's allergies indicates:  No Known Allergies    No current facility-administered medications on file prior to encounter.     Current Outpatient Medications on File Prior to Encounter   Medication Sig Dispense Refill    acetaminophen (TYLENOL ORAL) Take by mouth.      diphenhydrAMINE (BENADRYL) 25 mg capsule Take 1 capsule (25 mg total) by mouth 3 (three) times daily as needed (Severe headache--take with 10mg of Compazine). 10 capsule 0    prochlorperazine (COMPAZINE) 10 MG tablet Take 1 tablet (10 mg total) by mouth 3 (three) times daily as needed (Severe headache--take with 25mg of Benadryl). 10 tablet 0       Past Surgical History:   Procedure Laterality Date     SECTION       VITALS  Vitals:    24 0612   BP: (!) 98/50   Pulse: 85   Resp: 17   Temp: 36.7 °C (98 °F)       LABS  CBC:   Recent Labs     24  0539 24  0606   WBC  --  10.75   RBC  --  2.49*   HGB  --  5.7*   HCT 22* 18.7*   PLT  --  152   MCV  --  75*   MCH  --  22.9*   MCHC  --  30.5*       CMP:   Recent Labs     24  0606      K 3.8   *   CO2 15*   BUN 9   CREATININE 0.6   *   CALCIUM 7.0*   ALBUMIN 1.9*   PROT 4.8*   ALKPHOS 56   ALT 7*   AST 11   BILITOT 0.3       Pre-op Assessment    I have reviewed the Patient Summary Reports.    I have reviewed the NPO Status.      Review of Systems  Anesthesia Hx:  No problems with previous Anesthesia             Denies Family Hx of Anesthesia complications.    Denies Personal Hx of Anesthesia  complications.                    Social:  No Alcohol Use       Hematology/Oncology:    Oncology Normal    -- Anemia:               Hematology Comments: Pt presented with heavy vaginal bleeding + fetal demise.  Coags ordered & reviewed prior to neuraxial.    2 units pRBCs administered due to Hgb 5.7 at presentation                      EENT/Dental:  EENT/Dental Normal           Cardiovascular:  Cardiovascular Normal                                            Pulmonary:  Pulmonary Normal                       Renal/:  Renal/ Normal                 Hepatic/GI:  Hepatic/GI Normal                 Musculoskeletal:  Musculoskeletal Normal                OB/GYN/PEDS:  Fetal demise  Vaginal bleeding            Neurological:  Neurology Normal                                      Endocrine:  Endocrine Normal            Dermatological:  Skin Normal    Psych:  Psychiatric Normal                    Physical Exam  General: Well nourished and Cooperative    Airway:  Mallampati: II   Mouth Opening: Normal  TM Distance: Normal    Dental:  Intact    Chest/Lungs:  Normal Respiratory Rate    Heart:  Rate: Normal        Anesthesia Plan  Type of Anesthesia, risks & benefits discussed:    Anesthesia Type: Epidural, Spinal  Intra-op Monitoring Plan: Standard ASA Monitors  Induction:  IV  Informed Consent: Informed consent signed with the Patient and all parties understand the risks and agree with anesthesia plan.  All questions answered.   ASA Score: 3    Ready For Surgery From Anesthesia Perspective.     .

## 2024-06-23 NOTE — SUBJECTIVE & OBJECTIVE
Obstetric HPI:  Patient reports None contractions but she has had abdominal pain,  no  fetal movement, Yes vaginal bleeding , No loss of fluid     This pregnancy has been complicated by no prenatal care, now with fetal demise    OB History    Para Term  AB Living   4 2 2 0 0 2   SAB IAB Ectopic Multiple Live Births   0 0 0 0 0      # Outcome Date GA Lbr Doc/2nd Weight Sex Type Anes PTL Lv   4 Current            3       CS-LTranv      2 Term      CS-LTranv      1 Term      Vag-Spont        History reviewed. No pertinent past medical history.  Past Surgical History:   Procedure Laterality Date     SECTION         PTA Medications   Medication Sig    acetaminophen (TYLENOL ORAL) Take by mouth.    diphenhydrAMINE (BENADRYL) 25 mg capsule Take 1 capsule (25 mg total) by mouth 3 (three) times daily as needed (Severe headache--take with 10mg of Compazine).    prochlorperazine (COMPAZINE) 10 MG tablet Take 1 tablet (10 mg total) by mouth 3 (three) times daily as needed (Severe headache--take with 25mg of Benadryl).       Review of patient's allergies indicates:  No Known Allergies     Family History    None       Tobacco Use    Smoking status: Never    Smokeless tobacco: Not on file   Substance and Sexual Activity    Alcohol use: No    Drug use: No    Sexual activity: Yes     Partners: Male     Review of Systems   Objective:     Vital Signs (Most Recent):  Temp: 98 °F (36.7 °C) (24)  Pulse: 85 (24)  Resp: 17 (24)  BP: (!) 98/50 (24)  SpO2: 95 % (24) Vital Signs (24h Range):  Temp:  [97.9 °F (36.6 °C)-98.6 °F (37 °C)] 98 °F (36.7 °C)  Pulse:  [68-96] 85  Resp:  [16-34] 17  SpO2:  [95 %-99 %] 95 %  BP: ()/(46-54) 98/50     Weight: 106.6 kg (235 lb)  Body mass index is 37.93 kg/m².    FHT: None   TOCO:  irregular     Physical Exam     Cervix:  Dilation:  1  Effacement:  25%  Station: -3  Presentation: Vertex     Significant Labs:  Lab  Results   Component Value Date    [facility] A POS 06/23/2024       I have personallly reviewed all pertinent lab results from the last 24 hours.

## 2024-06-23 NOTE — ASSESSMENT & PLAN NOTE
Now with fetal demise  Will start labor induction with Cytotec  Blood transfusion  Monitor blood pressure and vitals  Expect  later today

## 2024-06-23 NOTE — NURSING
Labs reviewed by Dr. Campbell. Orders for repeat CBC tonight at 2100. No need to hang another unit of blood at this time.

## 2024-06-23 NOTE — NURSING
MELO Aragon RN to lab to obtain PRBC's. Blood not ready at this time. Tech will notify RN when ready.

## 2024-06-23 NOTE — NURSING
Dr. Campbell notified of low BP's 80's/40's. Orders to hang another bag of LR and will wait for latest CBC results.

## 2024-06-23 NOTE — NURSING
Patient arrived to unit via stretcher, per ED nurse.  Patient awake and alert, IVF and PRBC infusing upon arrival to unit. Per patient she has not seen anyone for this pregnancy.  TOCO, automatic BP and pulse ox connected. Dr Escobar is aware of patient and in route to hospital. Information obtained from patient via  [id number].

## 2024-06-24 LAB
BASOPHILS # BLD AUTO: 0.02 K/UL (ref 0–0.2)
BASOPHILS NFR BLD: 0.2 % (ref 0–1.9)
DIFFERENTIAL METHOD BLD: ABNORMAL
EOSINOPHIL # BLD AUTO: 0 K/UL (ref 0–0.5)
EOSINOPHIL NFR BLD: 0.1 % (ref 0–8)
ERYTHROCYTE [DISTWIDTH] IN BLOOD BY AUTOMATED COUNT: 16.7 % (ref 11.5–14.5)
HCT VFR BLD AUTO: 20.2 % (ref 37–48.5)
HGB BLD-MCNC: 6.3 G/DL (ref 12–16)
IMM GRANULOCYTES # BLD AUTO: 0.11 K/UL (ref 0–0.04)
IMM GRANULOCYTES NFR BLD AUTO: 1.1 % (ref 0–0.5)
LYMPHOCYTES # BLD AUTO: 1.6 K/UL (ref 1–4.8)
LYMPHOCYTES NFR BLD: 15.7 % (ref 18–48)
MCH RBC QN AUTO: 25.6 PG (ref 27–31)
MCHC RBC AUTO-ENTMCNC: 31.2 G/DL (ref 32–36)
MCV RBC AUTO: 82 FL (ref 82–98)
MONOCYTES # BLD AUTO: 0.8 K/UL (ref 0.3–1)
MONOCYTES NFR BLD: 7.5 % (ref 4–15)
NEUTROPHILS # BLD AUTO: 7.6 K/UL (ref 1.8–7.7)
NEUTROPHILS NFR BLD: 75.4 % (ref 38–73)
NRBC BLD-RTO: 0 /100 WBC
PLATELET # BLD AUTO: 106 K/UL (ref 150–450)
PMV BLD AUTO: 11.6 FL (ref 9.2–12.9)
RBC # BLD AUTO: 2.46 M/UL (ref 4–5.4)
RUBV IGG SER-ACNC: 12.7 IU/ML
RUBV IGG SER-IMP: REACTIVE
WBC # BLD AUTO: 10.1 K/UL (ref 3.9–12.7)

## 2024-06-24 PROCEDURE — 99232 SBSQ HOSP IP/OBS MODERATE 35: CPT | Mod: ,,, | Performed by: OBSTETRICS & GYNECOLOGY

## 2024-06-24 PROCEDURE — 36415 COLL VENOUS BLD VENIPUNCTURE: CPT | Performed by: OBSTETRICS & GYNECOLOGY

## 2024-06-24 PROCEDURE — 25000003 PHARM REV CODE 250: Performed by: OBSTETRICS & GYNECOLOGY

## 2024-06-24 PROCEDURE — 11000001 HC ACUTE MED/SURG PRIVATE ROOM

## 2024-06-24 PROCEDURE — 85025 COMPLETE CBC W/AUTO DIFF WBC: CPT | Performed by: OBSTETRICS & GYNECOLOGY

## 2024-06-24 PROCEDURE — 25000003 PHARM REV CODE 250: Performed by: ANESTHESIOLOGY

## 2024-06-24 RX ADMIN — IBUPROFEN 600 MG: 600 TABLET ORAL at 05:06

## 2024-06-24 RX ADMIN — ACETAMINOPHEN 650 MG: 325 TABLET ORAL at 11:06

## 2024-06-24 RX ADMIN — ACETAMINOPHEN 650 MG: 325 TABLET ORAL at 05:06

## 2024-06-24 RX ADMIN — PRENATAL VIT W/ FE FUMARATE-FA TAB 27-0.8 MG 1 TABLET: 27-0.8 TAB at 08:06

## 2024-06-24 RX ADMIN — IBUPROFEN 600 MG: 600 TABLET ORAL at 11:06

## 2024-06-24 RX ADMIN — DOCUSATE SODIUM 200 MG: 100 CAPSULE, LIQUID FILLED ORAL at 09:06

## 2024-06-24 RX ADMIN — DOCUSATE SODIUM 200 MG: 100 CAPSULE, LIQUID FILLED ORAL at 08:06

## 2024-06-24 RX ADMIN — MUPIROCIN: 20 OINTMENT TOPICAL at 08:06

## 2024-06-24 RX ADMIN — MUPIROCIN: 20 OINTMENT TOPICAL at 09:06

## 2024-06-24 NOTE — NURSING
Bedside handoff report completed. COLEMAN Garcia RN assuming care. RN aware that mother is still undecided on  Home and hasn't returned Notice of Parental Rights Form. COLEMAN Garcia RN to place ID bands on infant and take photos of infant per mother's consent when mother is ready. Mother is currently holding baby and visiting with family and will let COLEMAN Garcia RN know when she is ready.

## 2024-06-24 NOTE — ASSESSMENT & PLAN NOTE
Routine postpartum/postoperative care  Watch vaginal bleeding  Pain control  Ambulate   Discharge home tomorrow, 6/25/2024

## 2024-06-24 NOTE — SUBJECTIVE & OBJECTIVE
Interval History:   Status post repeat low transverse  section for fetal demise at 27 weeks, heavy vaginal bleeding, remote from delivery with history of  sections x 2    She is doing well this morning. She is tolerating a regular diet without nausea or vomiting. She is voiding spontaneously. She is ambulating. She has passed flatus, and has not a BM. Vaginal bleeding is mild. She denies fever or chills. Abdominal pain is mild and controlled with oral medications. She Is not breastfeeding. She desires circumcision for her male baby: not applicable.    Objective:     Vital Signs (Most Recent):  Temp: 98.7 °F (37.1 °C) (24)  Pulse: 66 (24)  Resp: 16 (24)  BP: (!) 83/47 (patient asleep) (24)  SpO2: 99 % (24) Vital Signs (24h Range):  Temp:  [98 °F (36.7 °C)-98.7 °F (37.1 °C)] 98.7 °F (37.1 °C)  Pulse:  [56-99] 66  Resp:  [16-18] 16  SpO2:  [97 %-100 %] 99 %  BP: ()/(42-65) 83/47     Weight: 106.6 kg (235 lb 0.2 oz)  Body mass index is 37.93 kg/m².      Intake/Output Summary (Last 24 hours) at 2024 0738  Last data filed at 2024 0320  Gross per 24 hour   Intake 4135 ml   Output 6240 ml   Net -2105 ml         Significant Labs:  Lab Results   Component Value Date    GROUPTRH A POS 2024    HEPBSAG Non-reactive 2024     Recent Labs   Lab 24  0559   HGB 6.3*   HCT 20.2*       CBC:   Recent Labs   Lab 24  0559   WBC 10.10   RBC 2.46*   HGB 6.3*   HCT 20.2*   *   MCV 82   MCH 25.6*   MCHC 31.2*     I have personallly reviewed all pertinent lab results from the last 24 hours.    Physical Exam:   Constitutional: She appears well-developed and well-nourished. No distress.    HENT:   Head: Normocephalic and atraumatic.    Eyes: EOM are normal.     Cardiovascular:  Normal rate.             Pulmonary/Chest: Effort normal. No respiratory distress.        Abdominal: Soft. She exhibits no distension. There is no  abdominal tenderness. There is no rebound and no guarding.   Pfannenstiel incision in AquaCel dressing.  No evidence of active bleeding.             Musculoskeletal: Normal range of motion.       Neurological: She is alert.    Skin: Skin is warm and dry.    Psychiatric: She has a normal mood and affect.       Review of Systems

## 2024-06-24 NOTE — PROGRESS NOTES
[facility]  Obstetrics  Postpartum Progress Note    Patient Name: Kamila Matamoros  MRN: [id number]  Admission Date: 2024  Hospital Length of Stay: 1 days  Attending Physician: Andrés Escobar MD  Primary Care Provider: Sirena, Primary Doctor    Subjective:     Principal Problem:Status post  section    Hospital Course:  Blood transfusion started  Patient sent to Labor and Delivery  BP better 98/50 after blood and fluid from ED  I came to evaluate patient  Blood at introitus  Cervix at 1.5 cm.  Long and somewhat soft    Patient with history of vaginal delivery of a term baby x 1, followed by CS x 2    Called by Nursing staff in patient's room.  Re. Heavy vaginal bleeding  About 500 cc at perineum on my arrival  Exam with cervix at 3 cm.  Vertex is high.  Patient in pain.  Discussed with patient and her mother regarding plan.  Would proceed with repeat  section.  Too much bleeding.  ?Abruption  Patient is status post 2 units pRBCs  Will give another unit now    [facility]  MD Regina Escobar MD  Anesthesia: Spinal by CINDA Campbell MD  Non-viable male infant at 1154 24  Weight: 2#7.2 1110 gm  Apgar: 0/0/0  Placenta: manually extracted intact with three vessels.  To Path  EBL: 500 cc  No complication    24 Doing much better.  Bleeding minimal.  Wants to get up.  Tolerating oral intake.      Interval History:   Status post repeat low transverse  section for fetal demise at 27 weeks, heavy vaginal bleeding, remote from delivery with history of  sections x 2    She is doing well this morning. She is tolerating a regular diet without nausea or vomiting. She is voiding spontaneously. She is ambulating. She has passed flatus, and has not a BM. Vaginal bleeding is mild. She denies fever or chills. Abdominal pain is mild and controlled with oral medications. She Is not breastfeeding. She desires circumcision for her male baby: not applicable.    Objective:     Vital Signs (Most  Recent):  Temp: 98.7 °F (37.1 °C) (24)  Pulse: 66 (24)  Resp: 16 (24)  BP: (!) 83/47 (patient asleep) (24)  SpO2: 99 % (24) Vital Signs (24h Range):  Temp:  [98 °F (36.7 °C)-98.7 °F (37.1 °C)] 98.7 °F (37.1 °C)  Pulse:  [56-99] 66  Resp:  [16-18] 16  SpO2:  [97 %-100 %] 99 %  BP: ()/(42-65) 83/47     Weight: 106.6 kg (235 lb 0.2 oz)  Body mass index is 37.93 kg/m².      Intake/Output Summary (Last 24 hours) at 2024 0738  Last data filed at 2024 0320  Gross per 24 hour   Intake 4135 ml   Output 6240 ml   Net -2105 ml         Significant Labs:  Lab Results   Component Value Date    GROUPTRH A POS 2024    HEPBSAG Non-reactive 2024     Recent Labs   Lab 24  0559   HGB 6.3*   HCT 20.2*       CBC:   Recent Labs   Lab 24  0559   WBC 10.10   RBC 2.46*   HGB 6.3*   HCT 20.2*   *   MCV 82   MCH 25.6*   MCHC 31.2*     I have personallly reviewed all pertinent lab results from the last 24 hours.    Physical Exam:   Constitutional: She appears well-developed and well-nourished. No distress.    HENT:   Head: Normocephalic and atraumatic.    Eyes: EOM are normal.     Cardiovascular:  Normal rate.             Pulmonary/Chest: Effort normal. No respiratory distress.        Abdominal: Soft. She exhibits no distension. There is no abdominal tenderness. There is no rebound and no guarding.   Pfannenstiel incision in AquaCel dressing.  No evidence of active bleeding.             Musculoskeletal: Normal range of motion.       Neurological: She is alert.    Skin: Skin is warm and dry.    Psychiatric: She has a normal mood and affect.       Review of Systems  Assessment/Plan:     29 y.o. female  for:    * Status post  section  Routine postpartum/postoperative care  Watch vaginal bleeding  Pain control  Ambulate   Discharge home tomorrow, 2024      Severe anemia  Hematocrit stable at 20.  Will monitor  symptomatically        Disposition: As patient meets milestones, will plan to discharge home.    Andrés Escobar MD  Obstetrics  [facility]

## 2024-06-24 NOTE — PLAN OF CARE
Problem: Adult Inpatient Plan of Care  Goal: Plan of Care Review  Outcome: Progressing  Goal: Patient-Specific Goal (Individualized)  Outcome: Progressing  Goal: Absence of Hospital-Acquired Illness or Injury  Outcome: Progressing  Goal: Optimal Comfort and Wellbeing  Outcome: Progressing  Goal: Readiness for Transition of Care  Outcome: Progressing     Problem:  Fall Injury Risk  Goal: Absence of Fall, Infant Drop and Related Injury  Outcome: Progressing     Problem: Infection  Goal: Absence of Infection Signs and Symptoms  Outcome: Progressing     Problem: Labor  Goal: Hemostasis  Outcome: Progressing  Goal: Stable Fetal Wellbeing  Outcome: Progressing  Goal: Effective Progression to Delivery  Outcome: Progressing  Goal: Normal Uterine Contraction Pattern  Outcome: Progressing     Problem: Postpartum ( Delivery)  Goal: Successful Parent Role Transition  Outcome: Progressing  Goal: Hemostasis  Outcome: Progressing  Goal: Effective Bowel Elimination  Outcome: Progressing  Goal: Fluid and Electrolyte Balance  Outcome: Progressing  Goal: Absence of Infection Signs and Symptoms  Outcome: Progressing  Goal: Anesthesia/Sedation Recovery  Outcome: Progressing  Goal: Optimal Pain Control and Function  Outcome: Progressing  Goal: Nausea and Vomiting Relief  Outcome: Progressing  Goal: Effective Urinary Elimination  Outcome: Progressing  Goal: Effective Oxygenation and Ventilation  Outcome: Progressing     Problem: Wound  Goal: Optimal Coping  Outcome: Progressing  Goal: Optimal Functional Ability  Outcome: Progressing  Goal: Absence of Infection Signs and Symptoms  Outcome: Progressing  Goal: Improved Oral Intake  Outcome: Progressing  Goal: Optimal Pain Control and Function  Outcome: Progressing  Goal: Skin Health and Integrity  Outcome: Progressing  Goal: Optimal Wound Healing  Outcome: Progressing

## 2024-06-24 NOTE — ANESTHESIA POSTPROCEDURE EVALUATION
Anesthesia Post Evaluation    Patient: Kamila Matamoros    Procedure(s) Performed: Procedure(s) (LRB):   SECTION (N/A)    Final Anesthesia Type: spinal      Patient location during evaluation: labor & delivery  Patient participation: Yes- Able to Participate  Level of consciousness: awake and alert and oriented  Post-procedure vital signs: reviewed and stable  Pain management: adequate  Airway patency: patent    PONV status at discharge: No PONV  Anesthetic complications: no      Cardiovascular status: blood pressure returned to baseline, stable and hemodynamically stable  Respiratory status: unassisted, room air and spontaneous ventilation  Hydration status: euvolemic  Follow-up not needed.              Vitals Value Taken Time   BP 83/47 24 0521   Temp 37.1 °C (98.7 °F) 24 05   Pulse 66 24 0521   Resp 16 24 05   SpO2 99 % 24         Event Time   Out of Recovery 16:30:00         Pain/Liliam Score: Pain Rating Prior to Med Admin: 0 (2024  5:23 AM)  Pain Rating Post Med Admin: 0 (2024  5:59 AM)

## 2024-06-24 NOTE — NURSING
Patient transferred to  241 via stretcher accompanied by AGUILA Garcia RN.. Patient tolerated well, VSS, denies needs at this time. Oriented to room, bed low, call bell within reach.

## 2024-06-24 NOTE — PROGRESS NOTES
Up to bathroom x1 assist. Voided 400 ml urine with 1 small clot noted in specihat. Gait steady.  Light rubra lochia on peripad. Pericare done. Denies pain at this time.

## 2024-06-24 NOTE — PLAN OF CARE
Problem: Adult Inpatient Plan of Care  Goal: Plan of Care Review  Outcome: Progressing  Goal: Patient-Specific Goal (Individualized)  Outcome: Progressing  Goal: Absence of Hospital-Acquired Illness or Injury  Outcome: Progressing  Goal: Optimal Comfort and Wellbeing  Outcome: Progressing  Goal: Readiness for Transition of Care  Outcome: Progressing     Problem:  Fall Injury Risk  Goal: Absence of Fall, Infant Drop and Related Injury  Outcome: Progressing     Problem: Infection  Goal: Absence of Infection Signs and Symptoms  Outcome: Progressing     Problem: Labor  Goal: Hemostasis  Outcome: Progressing     Problem: Postpartum ( Delivery)  Goal: Hemostasis  Outcome: Progressing  Goal: Effective Bowel Elimination  Outcome: Progressing  Goal: Fluid and Electrolyte Balance  Outcome: Progressing  Goal: Absence of Infection Signs and Symptoms  Outcome: Progressing  Goal: Anesthesia/Sedation Recovery  Outcome: Progressing  Goal: Optimal Pain Control and Function  Outcome: Progressing  Goal: Nausea and Vomiting Relief  Outcome: Progressing  Goal: Effective Urinary Elimination  Outcome: Progressing  Goal: Effective Oxygenation and Ventilation  Outcome: Progressing     Problem: Wound  Goal: Optimal Coping  Outcome: Progressing  Goal: Optimal Functional Ability  Outcome: Progressing  Goal: Absence of Infection Signs and Symptoms  Outcome: Progressing  Goal: Improved Oral Intake  Outcome: Progressing  Goal: Optimal Pain Control and Function  Outcome: Progressing  Goal: Skin Health and Integrity  Outcome: Progressing  Goal: Optimal Wound Healing  Outcome: Progressing

## 2024-06-25 VITALS
RESPIRATION RATE: 18 BRPM | HEART RATE: 80 BPM | HEIGHT: 66 IN | SYSTOLIC BLOOD PRESSURE: 98 MMHG | TEMPERATURE: 99 F | BODY MASS INDEX: 37.77 KG/M2 | WEIGHT: 235 LBS | OXYGEN SATURATION: 99 % | DIASTOLIC BLOOD PRESSURE: 51 MMHG

## 2024-06-25 LAB
BLD PROD TYP BPU: NORMAL
BLOOD UNIT EXPIRATION DATE: NORMAL
BLOOD UNIT TYPE CODE: 6200
BLOOD UNIT TYPE: NORMAL
CODING SYSTEM: NORMAL
CROSSMATCH INTERPRETATION: NORMAL
DISPENSE STATUS: NORMAL
FINAL PATHOLOGIC DIAGNOSIS: NORMAL
GROSS: NORMAL
Lab: NORMAL
NUM UNITS TRANS PACKED RBC: NORMAL

## 2024-06-25 PROCEDURE — 25000003 PHARM REV CODE 250: Performed by: OBSTETRICS & GYNECOLOGY

## 2024-06-25 PROCEDURE — 99238 HOSP IP/OBS DSCHRG MGMT 30/<: CPT | Mod: ,,, | Performed by: OBSTETRICS & GYNECOLOGY

## 2024-06-25 RX ORDER — IBUPROFEN 600 MG/1
600 TABLET ORAL EVERY 6 HOURS PRN
Qty: 40 TABLET | Refills: 1 | Status: SHIPPED | OUTPATIENT
Start: 2024-06-25 | End: 2025-06-25

## 2024-06-25 RX ADMIN — IBUPROFEN 600 MG: 600 TABLET ORAL at 05:06

## 2024-06-25 RX ADMIN — PRENATAL VIT W/ FE FUMARATE-FA TAB 27-0.8 MG 1 TABLET: 27-0.8 TAB at 08:06

## 2024-06-25 RX ADMIN — MUPIROCIN: 20 OINTMENT TOPICAL at 08:06

## 2024-06-25 RX ADMIN — DOCUSATE SODIUM 200 MG: 100 CAPSULE, LIQUID FILLED ORAL at 08:06

## 2024-06-25 NOTE — DISCHARGE INSTRUCTIONS
After a Cesearean Birth    General Discharge Instructions  May follow a regular diet, unless otherwise discussed with physician.  Take showers, not baths until instructed by your doctor.   For the next 5 days, continue to use Hibiclens solution when you shower. Always use a clean towel when drying incision.  Incision dressing should be removed 7 days after surgery. If dressing begins to peel up prior to this day, gently remove.    Activity as tolerated.  No lifting or heavy exercise for 6 weeks, no driving for 2 weeks, no sexual intercourse, douching or tampons for 6 weeks  May return to work/school as discussed with physician  Discuss birth control with physician  Breast care support bra worn at all times  Lactation consultant referral number ( [phone] or [phone])    Call Your Healthcare Provider Right Away If You Have:  A temperature of 100.4°F or higher.  If your blood pressure is over 140/90.  You have difficulty catching your breath or trouble breathing.  Heavy vaginal bleeding, clots, or vaginal discharge with foul odor. (heavier than menses)  Persistent nausea or vomiting.  You gain more than 3 pounds in 3 days.  Severe headaches not relieved by Tylenol (acetaminophen) or Motrin (ibuprofen)  Blurry or double vision, see spots or flashing lights.  Dizziness or fainting.  New onset swelling or worsening of existing swelling.  Burning or pain when you urinate.  No bowel movement for 5 days.  Redness, warmth, swelling, or pain in the lower leg.  Redness, discharge, or pain worse than you had in the hospital.  Burning, pain, red streaks, or lumpy areas in your breasts.  Cracks, blisters, or blood on your nipples.  Feelings of extreme sadness or anxiety  If you have any new or unusual symptoms or have questions or concerns    Incision Care  You will be able to shower and pat the incision dry.  Watch your incision for signs of infection, such as increasing redness or drainage.  For ease of movement, hold a  pillow against the incision when you get up from a lying or sitting position, and when you laugh or cough.  Avoid heavy lifting--nothing heavier than your baby until your doctor instructs you otherwise.     Follow-Up  Schedule a  follow-up exam with your healthcare provider for about 6 weeks after delivery. During this exam, your uterus and vaginal area will be checked. Contact your healthcare provider if you think you or your baby are having any problems.                                                                                        After a Cesearean Birth    General Discharge Instructions  May follow a regular diet, unless otherwise discussed with physician.  Take showers, not baths until instructed by your doctor.   For the next 5 days, continue to use Hibiclens solution when you shower. Always use a clean towel when drying incision.  Incision dressing should be removed 7 days after surgery. If dressing begins to peel up prior to this day, gently remove.    Activity as tolerated.  No lifting or heavy exercise for 6 weeks, no driving for 2 weeks, no sexual intercourse, douching or tampons for 6 weeks  May return to work/school as discussed with physician  Discuss birth control with physician  Breast care support bra worn at all times  Lactation consultant referral number ( [phone] or [phone])    Call Your Healthcare Provider Right Away If You Have:  A temperature of 100.4°F or higher.  If your blood pressure is over 140/90.  You have difficulty catching your breath or trouble breathing.  Heavy vaginal bleeding, clots, or vaginal discharge with foul odor. (heavier than menses)  Persistent nausea or vomiting.  You gain more than 3 pounds in 3 days.  Severe headaches not relieved by Tylenol (acetaminophen) or Motrin (ibuprofen)  Blurry or double vision, see spots or flashing lights.  Dizziness or fainting.  New onset swelling or worsening of existing swelling.  Burning or pain when  you urinate.  No bowel movement for 5 days.  Redness, warmth, swelling, or pain in the lower leg.  Redness, discharge, or pain worse than you had in the hospital.  Burning, pain, red streaks, or lumpy areas in your breasts.  Cracks, blisters, or blood on your nipples.  Feelings of extreme sadness or anxiety, or a feeling that you dont want to be with your baby.  If you have any new or unusual symptoms or have questions or concerns    Incision Care  You will be able to shower and pat the incision dry.  Watch your incision for signs of infection, such as increasing redness or drainage.  For ease of movement, hold a pillow against the incision when you get up from a lying or sitting position, and when you laugh or cough.  Avoid heavy lifting--nothing heavier than your baby until your doctor instructs you otherwise.     Follow-Up  Schedule a  follow-up exam with your healthcare provider for about 6 weeks after delivery. During this exam, your uterus and vaginal area will be checked. Contact your healthcare provider if you think you or your baby are having any problems.

## 2024-06-25 NOTE — PLAN OF CARE
Problem: Adult Inpatient Plan of Care  Goal: Plan of Care Review  Outcome: Progressing  Goal: Patient-Specific Goal (Individualized)  Outcome: Progressing  Goal: Absence of Hospital-Acquired Illness or Injury  Outcome: Progressing  Goal: Optimal Comfort and Wellbeing  Outcome: Progressing  Goal: Readiness for Transition of Care  Outcome: Progressing     Problem:  Fall Injury Risk  Goal: Absence of Fall, Infant Drop and Related Injury  Outcome: Progressing     Problem: Infection  Goal: Absence of Infection Signs and Symptoms  Outcome: Progressing     Problem: Postpartum ( Delivery)  Goal: Hemostasis  Outcome: Progressing  Goal: Absence of Infection Signs and Symptoms  Outcome: Progressing     Problem: Wound  Goal: Optimal Coping  Outcome: Progressing  Goal: Optimal Functional Ability  Outcome: Progressing  Goal: Absence of Infection Signs and Symptoms  Outcome: Progressing  Goal: Improved Oral Intake  Outcome: Progressing  Goal: Optimal Pain Control and Function  Outcome: Progressing  Goal: Skin Health and Integrity  Outcome: Progressing  Goal: Optimal Wound Healing  Outcome: Progressing

## 2024-06-25 NOTE — DISCHARGE SUMMARY
[facility]  Obstetrics  Discharge Summary      Patient Name: Kamila Matamoros  MRN: [id number]  Admission Date: 2024  Hospital Length of Stay: 2 days  Discharge Date and Time:  2024 7:40 AM  Attending Physician: Andrés Escobar MD   Discharging Provider: Andrés Escobar MD   Primary Care Provider: Sirena, Primary Doctor    HPI: 30 yo    Presented to our ED with vaginal bleeding  Last menstrual period was reportedly May 20, 2024 and last vaginal delivery was 2023   Acute onset of lower abdominal cramping and heavy vaginal bleeding 8 hours prior to presentation.   In our Emergency Department, blood seen at introitus  Syncope.  Hypotensive with BP as low as 75/47  H/H at 5.7/18.7  Ultrasound with fetal demise at about 27 weeks  I was then called          Procedure(s) (LRB):   SECTION (N/A)     Hospital Course:   Blood transfusion started  Patient sent to Labor and Delivery  BP better 98/50 after blood and fluid from ED  I came to evaluate patient  Blood at introitus  Cervix at 1.5 cm.  Long and somewhat soft    Patient with history of vaginal delivery of a term baby x 1, followed by CS x 2    Called by Nursing staff in patient's room.  Re. Heavy vaginal bleeding  About 500 cc at perineum on my arrival  Exam with cervix at 3 cm.  Vertex is high.  Patient in pain.  Discussed with patient and her mother regarding plan.  Would proceed with repeat  section.  Too much bleeding.  ?Abruption  Patient is status post 2 units pRBCs  Will give another unit now    [facility]  MD Regina Escobar MD  Anesthesia: Spinal by CINDA Campbell MD  Non-viable male infant at 1154 24  Weight: 2#7.2 1110 gm  Apgar: 0/0/0  Placenta: manually extracted intact with three vessels.  To Path  EBL: 500 cc  No complication    24 Doing much better.  Bleeding minimal.  Wants to get up.  Tolerating oral intake.      2024  Postpartum course was benign.  She is tolerating oral-feeding well.  Exam was  "benign with patient afebrile, vitals stable, and minimal bleeding.  Normal activities.  Patient discharged home on postpartum day #2, 2024   Discharge medications include Motrin, prenatal vitamins, and iron supplement.  Follow-up with me next week for dressing removal    Andrés Escobar MD.           Final Active Diagnoses:    Diagnosis Date Noted POA    PRINCIPAL PROBLEM:  Status post  section [Z98.891] 2024 Not Applicable    Severe anemia [D64.9] 2024 Yes      Problems Resolved During this Admission:    Diagnosis Date Noted Date Resolved POA    Fetal demise, greater than 22 weeks, antepartum, single gestation [O36.4XX0] 2024 Yes    27 weeks gestation of pregnancy [Z3A.27] 2024 Not Applicable    Vaginal bleeding [N93.9] 2024 Yes    Vaginal bleeding in pregnancy [O46.90] 2024 Yes    Insufficient prenatal care in second trimester [O09.32] 2024 Not Applicable    Hypotension due to hypovolemia [E86.1] 2024 Yes        Significant Diagnostic Studies: Labs: All labs within the past 24 hours have been reviewed      Feeding Method: fetal demise    Immunizations       Date Immunization Status Dose Route/Site Given by    24 1453 MMR Incomplete 0.5 mL Subcutaneous/     24 1453 Tdap Incomplete 0.5 mL Intramuscular/             Delivery:    Episiotomy: None   Lacerations: None   Repair suture: None   Repair # of packets:     Blood loss (ml):       Birth information:  YOB: 2024   Time of birth: 11:54 AM   Sex: male   Delivery type: , Low Transverse   Gestational Age: <None>     Measurements    Weight: 1110 g  Weight (lbs): 2 lb 7.2 oz  Length: 36.8 cm  Length (in): 14.5"  Head circumference: 25.5 cm  Chest circumference: 21 cm  Abdominal girth: 22 cm         Delivery Clinician: Delivery Providers    Delivering clinician: Andrés Escobar MD   Provider Role    Madison Morin RN "     Valerie Charles, Bc Perez, Kyara Gregg MD Lewis, Melissa, Jamilah Gutierrez, GILESP     Haylee Tapia MD Assisting Surgeon             Additional  information:  Forceps:    Vacuum:    Breech:    Observed anomalies      Living?:     Apgars    Living status: Fetal Demise  Apgar Component Scores:  1 min.:  5 min.:  10 min.:  15 min.:  20 min.:    Skin color:  0  0  0  0  0    Heart rate:  0  0  0  0  0    Reflex irritability:  0  0  0  0  0    Muscle tone:  0  0  0  0  0    Respiratory effort:  0  0  0  0  0    Total:  0  0  0  0  0    Apgars assigned by: DR. ESCOBAR         Placenta: Delivered:       appearance  Pending Diagnostic Studies:       Procedure Component Value Units Date/Time    Specimen to Pathology, Surgery Gynecology and Obstetrics [[id number]] Collected: 24 1154    Order Status: Sent Lab Status: In process Updated: 24 0728    Specimen: Tissue             Discharged Condition: good    Disposition: Home or Self Care    Follow Up:   Follow-up Information       Andrés Escobar MD Follow up in 1 week(s).    Specialties: Obstetrics and Gynecology, Obstetrics and Gynecology  Why: postpartum follow-up, postop follow-up, wound re-check  Contact information:  [address]  [address] [place], [place] [id number] [phone]                           Patient Instructions:   No discharge procedures on file.  Medications:  Current Discharge Medication List        START taking these medications    Details   ibuprofen (ADVIL,MOTRIN) 600 MG tablet Take 1 tablet (600 mg total) by mouth every 6 (six) hours as needed for Pain.  Qty: 40 tablet, Refills: 1           CONTINUE these medications which have NOT CHANGED    Details   diphenhydrAMINE (BENADRYL) 25 mg capsule Take 1 capsule (25 mg total) by mouth 3 (three) times daily as needed (Severe headache--take with 10mg of Compazine).  Qty: 10 capsule, Refills: 0      prochlorperazine (COMPAZINE) 10 MG tablet Take 1 tablet (10 mg  total) by mouth 3 (three) times daily as needed (Severe headache--take with 25mg of Benadryl).  Qty: 10 tablet, Refills: 0           STOP taking these medications       acetaminophen (TYLENOL ORAL) Comments:   Reason for Stopping:               Andrés Escobar MD  Obstetrics  [facility]

## 2024-06-25 NOTE — PLAN OF CARE
Patient discharged per order. VS stable with no signs of distress. Discharge instructions reviewed with patient. Reviewed urgent maternal warning signs and instructed to go to ER or call physician if symptoms occur. Reviewed signs and symptoms of depression and anxiety and provided relevant handouts. Reviewed community resources available. Instructed on follow-up appointment with physician. Patient voiced understanding of all.     Problem: Adult Inpatient Plan of Care  Goal: Plan of Care Review  Outcome: Met  Goal: Patient-Specific Goal (Individualized)  Outcome: Met  Goal: Absence of Hospital-Acquired Illness or Injury  Outcome: Met  Goal: Optimal Comfort and Wellbeing  Outcome: Met  Goal: Readiness for Transition of Care  Outcome: Met     Problem:  Fall Injury Risk  Goal: Absence of Fall, Infant Drop and Related Injury  Outcome: Met     Problem: Infection  Goal: Absence of Infection Signs and Symptoms  Outcome: Met     Problem: Labor  Goal: Hemostasis  Outcome: Met  Goal: Normal Uterine Contraction Pattern  Outcome: Met     Problem: Postpartum ( Delivery)  Goal: Successful Parent Role Transition  Outcome: Met  Goal: Hemostasis  Outcome: Met  Goal: Fluid and Electrolyte Balance  Outcome: Met  Goal: Absence of Infection Signs and Symptoms  Outcome: Met     Problem: Wound  Goal: Optimal Coping  Outcome: Met  Goal: Optimal Functional Ability  Outcome: Met  Goal: Absence of Infection Signs and Symptoms  Outcome: Met  Goal: Improved Oral Intake  Outcome: Met  Goal: Optimal Pain Control and Function  Outcome: Met  Goal: Skin Health and Integrity  Outcome: Met  Goal: Optimal Wound Healing  Outcome: Met

## 2024-06-25 NOTE — NURSING
[id number]:  home calling asking for baby's weight, length and time of birth. , Tamie, said there is an organization who will build a customized casket and bury the baby free of charge. Information given.